# Patient Record
Sex: FEMALE
[De-identification: names, ages, dates, MRNs, and addresses within clinical notes are randomized per-mention and may not be internally consistent; named-entity substitution may affect disease eponyms.]

---

## 2020-03-06 ENCOUNTER — NURSE TRIAGE (OUTPATIENT)
Dept: OTHER | Facility: CLINIC | Age: 40
End: 2020-03-06

## 2020-03-06 NOTE — TELEPHONE ENCOUNTER
Reason for Disposition   [1] Constant abdominal pain AND [2] present > 2 hours    Protocols used: VOMITING-ADULT-AH    Patient called in via the [address] to report symptoms of generalized aches, diarrhea and vomiting beginning this morning. Reports having three episodes of vomiting today. States abdominal pain is present. Patient informed of disposition. Care advice as documented. Instructed patient to call back with worsening symptoms. Patient verbalized understanding and denies any further questions/concerns. Please do not respond to the triage nurse through this encounter. Any subsequent communication should be directly with the patient.

## 2023-07-19 ENCOUNTER — APPOINTMENT (OUTPATIENT)
Dept: PRIMARY CARE | Facility: CLINIC | Age: 43
End: 2023-07-19
Payer: COMMERCIAL

## 2023-07-25 PROBLEM — G47.33 OSA (OBSTRUCTIVE SLEEP APNEA): Status: ACTIVE | Noted: 2023-07-25

## 2023-07-25 PROBLEM — R10.2 PELVIC CRAMPING: Status: ACTIVE | Noted: 2023-07-25

## 2023-07-25 PROBLEM — R73.03 PREDIABETES: Status: ACTIVE | Noted: 2023-07-25

## 2023-07-25 PROBLEM — J30.9 ALLERGIC RHINITIS: Status: ACTIVE | Noted: 2023-07-25

## 2023-07-25 PROBLEM — M79.644 PAIN OF FINGER OF RIGHT HAND: Status: ACTIVE | Noted: 2023-07-25

## 2023-07-25 PROBLEM — R63.5 ABNORMAL WEIGHT GAIN: Status: ACTIVE | Noted: 2023-07-25

## 2023-07-25 PROBLEM — E78.3 HYPERTRIGLYCERIDEMIA, SPORADIC: Status: ACTIVE | Noted: 2023-07-25

## 2023-07-25 PROBLEM — M54.9 BACKACHE: Status: ACTIVE | Noted: 2023-07-25

## 2023-07-25 PROBLEM — N83.209 OVARIAN CYST: Status: ACTIVE | Noted: 2023-07-25

## 2023-07-25 PROBLEM — R20.8 DYSESTHESIA: Status: ACTIVE | Noted: 2023-07-25

## 2023-07-25 PROBLEM — E78.00 HYPERCHOLESTEREMIA: Status: ACTIVE | Noted: 2023-07-25

## 2023-07-25 PROBLEM — E28.2 PCOS (POLYCYSTIC OVARIAN SYNDROME): Status: ACTIVE | Noted: 2023-07-25

## 2023-07-25 PROBLEM — E55.9 VITAMIN D DEFICIENCY: Status: ACTIVE | Noted: 2023-07-25

## 2023-07-25 PROBLEM — E66.01 MORBID OBESITY (MULTI): Status: ACTIVE | Noted: 2023-07-25

## 2023-07-25 PROBLEM — R06.83 SNORING: Status: ACTIVE | Noted: 2023-07-25

## 2023-07-25 PROBLEM — N92.6 IRREGULAR MENSES: Status: ACTIVE | Noted: 2023-07-25

## 2023-07-25 PROBLEM — G47.00 INSOMNIA: Status: ACTIVE | Noted: 2023-07-25

## 2023-07-25 PROBLEM — N91.1 AMENORRHEA, SECONDARY: Status: ACTIVE | Noted: 2023-07-25

## 2023-07-25 PROBLEM — I10 BENIGN ESSENTIAL HTN: Status: ACTIVE | Noted: 2023-07-25

## 2023-07-25 PROBLEM — N93.9 ABNORMAL UTERINE BLEEDING: Status: ACTIVE | Noted: 2023-07-25

## 2023-07-25 PROBLEM — M54.2 NECK ACHE: Status: ACTIVE | Noted: 2023-07-25

## 2023-07-25 PROBLEM — M65.351 TRIGGER LITTLE FINGER OF RIGHT HAND: Status: ACTIVE | Noted: 2023-07-25

## 2023-07-25 PROBLEM — N97.9 FEMALE INFERTILITY: Status: ACTIVE | Noted: 2023-07-25

## 2023-07-25 PROBLEM — R40.0 DAYTIME SOMNOLENCE: Status: ACTIVE | Noted: 2023-07-25

## 2023-07-25 PROBLEM — R06.81 WITNESSED EPISODE OF APNEA: Status: ACTIVE | Noted: 2023-07-25

## 2023-07-25 RX ORDER — FLUTICASONE PROPIONATE 50 MCG
SPRAY, SUSPENSION (ML) NASAL
COMMUNITY
Start: 2020-10-05

## 2023-07-25 RX ORDER — CHOLECALCIFEROL (VITAMIN D3) 50 MCG
5000 TABLET ORAL
COMMUNITY
Start: 2021-03-10

## 2023-07-25 RX ORDER — METFORMIN HYDROCHLORIDE 500 MG/1
1 TABLET, EXTENDED RELEASE ORAL DAILY
COMMUNITY
Start: 2021-10-11 | End: 2024-02-07 | Stop reason: SINTOL

## 2023-07-25 RX ORDER — ROSUVASTATIN CALCIUM 20 MG/1
1 TABLET, COATED ORAL DAILY
COMMUNITY
Start: 2021-03-10 | End: 2023-07-26 | Stop reason: SDUPTHER

## 2023-07-25 RX ORDER — BLOOD-GLUCOSE METER
KIT MISCELLANEOUS
COMMUNITY
Start: 2018-10-30

## 2023-07-26 ENCOUNTER — OFFICE VISIT (OUTPATIENT)
Dept: PRIMARY CARE | Facility: CLINIC | Age: 43
End: 2023-07-26
Payer: COMMERCIAL

## 2023-07-26 ENCOUNTER — LAB (OUTPATIENT)
Dept: LAB | Facility: LAB | Age: 43
End: 2023-07-26
Payer: COMMERCIAL

## 2023-07-26 VITALS
HEIGHT: 65 IN | OXYGEN SATURATION: 99 % | SYSTOLIC BLOOD PRESSURE: 132 MMHG | WEIGHT: 290 LBS | DIASTOLIC BLOOD PRESSURE: 78 MMHG | HEART RATE: 91 BPM | BODY MASS INDEX: 48.32 KG/M2 | RESPIRATION RATE: 20 BRPM

## 2023-07-26 DIAGNOSIS — I10 BENIGN ESSENTIAL HTN: ICD-10-CM

## 2023-07-26 DIAGNOSIS — E78.2 MIXED HYPERLIPIDEMIA: ICD-10-CM

## 2023-07-26 DIAGNOSIS — M25.561 CHRONIC PAIN OF RIGHT KNEE: ICD-10-CM

## 2023-07-26 DIAGNOSIS — Z12.11 ENCOUNTER FOR SCREENING FOR MALIGNANT NEOPLASM OF COLON: Primary | ICD-10-CM

## 2023-07-26 DIAGNOSIS — Z00.00 HEALTHCARE MAINTENANCE: ICD-10-CM

## 2023-07-26 DIAGNOSIS — E78.00 HYPERCHOLESTEREMIA: ICD-10-CM

## 2023-07-26 DIAGNOSIS — M67.40 GANGLION: ICD-10-CM

## 2023-07-26 DIAGNOSIS — G89.29 CHRONIC PAIN OF RIGHT KNEE: ICD-10-CM

## 2023-07-26 DIAGNOSIS — Z12.31 ENCOUNTER FOR SCREENING MAMMOGRAM FOR MALIGNANT NEOPLASM OF BREAST: ICD-10-CM

## 2023-07-26 LAB
ALANINE AMINOTRANSFERASE (SGPT) (U/L) IN SER/PLAS: 17 U/L (ref 7–45)
ALBUMIN (G/DL) IN SER/PLAS: 4.4 G/DL (ref 3.4–5)
ALKALINE PHOSPHATASE (U/L) IN SER/PLAS: 47 U/L (ref 33–110)
ANION GAP IN SER/PLAS: 15 MMOL/L (ref 10–20)
ASPARTATE AMINOTRANSFERASE (SGOT) (U/L) IN SER/PLAS: 22 U/L (ref 9–39)
BASOPHILS (10*3/UL) IN BLOOD BY AUTOMATED COUNT: 0.03 X10E9/L (ref 0–0.1)
BASOPHILS/100 LEUKOCYTES IN BLOOD BY AUTOMATED COUNT: 0.7 % (ref 0–2)
BILIRUBIN TOTAL (MG/DL) IN SER/PLAS: 0.4 MG/DL (ref 0–1.2)
CALCIUM (MG/DL) IN SER/PLAS: 9.7 MG/DL (ref 8.6–10.6)
CARBON DIOXIDE, TOTAL (MMOL/L) IN SER/PLAS: 25 MMOL/L (ref 21–32)
CHLORIDE (MMOL/L) IN SER/PLAS: 103 MMOL/L (ref 98–107)
CHOLESTEROL (MG/DL) IN SER/PLAS: 173 MG/DL (ref 0–199)
CHOLESTEROL IN HDL (MG/DL) IN SER/PLAS: 38.6 MG/DL
CHOLESTEROL/HDL RATIO: 4.5
CREATININE (MG/DL) IN SER/PLAS: 0.67 MG/DL (ref 0.5–1.05)
EOSINOPHILS (10*3/UL) IN BLOOD BY AUTOMATED COUNT: 0.03 X10E9/L (ref 0–0.7)
EOSINOPHILS/100 LEUKOCYTES IN BLOOD BY AUTOMATED COUNT: 0.7 % (ref 0–6)
ERYTHROCYTE DISTRIBUTION WIDTH (RATIO) BY AUTOMATED COUNT: 15.7 % (ref 11.5–14.5)
ERYTHROCYTE MEAN CORPUSCULAR HEMOGLOBIN CONCENTRATION (G/DL) BY AUTOMATED: 30.6 G/DL (ref 32–36)
ERYTHROCYTE MEAN CORPUSCULAR VOLUME (FL) BY AUTOMATED COUNT: 87 FL (ref 80–100)
ERYTHROCYTES (10*6/UL) IN BLOOD BY AUTOMATED COUNT: 5.13 X10E12/L (ref 4–5.2)
ESTIMATED AVERAGE GLUCOSE FOR HBA1C: 137 MG/DL
GFR FEMALE: >90 ML/MIN/1.73M2
GLUCOSE (MG/DL) IN SER/PLAS: 86 MG/DL (ref 74–99)
HEMATOCRIT (%) IN BLOOD BY AUTOMATED COUNT: 44.8 % (ref 36–46)
HEMOGLOBIN (G/DL) IN BLOOD: 13.7 G/DL (ref 12–16)
HEMOGLOBIN A1C/HEMOGLOBIN TOTAL IN BLOOD: 6.4 %
IMMATURE GRANULOCYTES/100 LEUKOCYTES IN BLOOD BY AUTOMATED COUNT: 1.1 % (ref 0–0.9)
LDL: 105 MG/DL (ref 0–99)
LEUKOCYTES (10*3/UL) IN BLOOD BY AUTOMATED COUNT: 4.6 X10E9/L (ref 4.4–11.3)
LYMPHOCYTES (10*3/UL) IN BLOOD BY AUTOMATED COUNT: 3.24 X10E9/L (ref 1.2–4.8)
LYMPHOCYTES/100 LEUKOCYTES IN BLOOD BY AUTOMATED COUNT: 70.4 % (ref 13–44)
MONOCYTES (10*3/UL) IN BLOOD BY AUTOMATED COUNT: 0.28 X10E9/L (ref 0.1–1)
MONOCYTES/100 LEUKOCYTES IN BLOOD BY AUTOMATED COUNT: 6.1 % (ref 2–10)
NEUTROPHILS (10*3/UL) IN BLOOD BY AUTOMATED COUNT: 0.97 X10E9/L (ref 1.2–7.7)
NEUTROPHILS/100 LEUKOCYTES IN BLOOD BY AUTOMATED COUNT: 21 % (ref 40–80)
NRBC (PER 100 WBCS) BY AUTOMATED COUNT: 0 /100 WBC (ref 0–0)
OVALOCYTES PRESENCE IN BLOOD BY LIGHT MICROSCOPY: NORMAL
PLATELETS (10*3/UL) IN BLOOD AUTOMATED COUNT: 40 X10E9/L (ref 150–450)
POTASSIUM (MMOL/L) IN SER/PLAS: 5.2 MMOL/L (ref 3.5–5.3)
PROTEIN TOTAL: 7.9 G/DL (ref 6.4–8.2)
RBC MORPHOLOGY IN BLOOD: NORMAL
SODIUM (MMOL/L) IN SER/PLAS: 138 MMOL/L (ref 136–145)
THYROTROPIN (MIU/L) IN SER/PLAS BY DETECTION LIMIT <= 0.05 MIU/L: 2.26 MIU/L (ref 0.44–3.98)
TRIGLYCERIDE (MG/DL) IN SER/PLAS: 147 MG/DL (ref 0–149)
UREA NITROGEN (MG/DL) IN SER/PLAS: 15 MG/DL (ref 6–23)
VLDL: 29 MG/DL (ref 0–40)

## 2023-07-26 PROCEDURE — 99213 OFFICE O/P EST LOW 20 MIN: CPT | Performed by: INTERNAL MEDICINE

## 2023-07-26 PROCEDURE — 85060 BLOOD SMEAR INTERPRETATION: CPT | Performed by: PATHOLOGY

## 2023-07-26 PROCEDURE — 85025 COMPLETE CBC W/AUTO DIFF WBC: CPT

## 2023-07-26 PROCEDURE — 3008F BODY MASS INDEX DOCD: CPT | Performed by: INTERNAL MEDICINE

## 2023-07-26 PROCEDURE — 1036F TOBACCO NON-USER: CPT | Performed by: INTERNAL MEDICINE

## 2023-07-26 PROCEDURE — 80053 COMPREHEN METABOLIC PANEL: CPT

## 2023-07-26 PROCEDURE — 3078F DIAST BP <80 MM HG: CPT | Performed by: INTERNAL MEDICINE

## 2023-07-26 PROCEDURE — 84443 ASSAY THYROID STIM HORMONE: CPT

## 2023-07-26 PROCEDURE — 3075F SYST BP GE 130 - 139MM HG: CPT | Performed by: INTERNAL MEDICINE

## 2023-07-26 PROCEDURE — 36415 COLL VENOUS BLD VENIPUNCTURE: CPT

## 2023-07-26 PROCEDURE — 83036 HEMOGLOBIN GLYCOSYLATED A1C: CPT

## 2023-07-26 PROCEDURE — 80061 LIPID PANEL: CPT

## 2023-07-26 PROCEDURE — 99396 PREV VISIT EST AGE 40-64: CPT | Performed by: INTERNAL MEDICINE

## 2023-07-26 RX ORDER — ROSUVASTATIN CALCIUM 20 MG/1
20 TABLET, COATED ORAL DAILY
Qty: 90 TABLET | Refills: 1 | Status: SHIPPED | OUTPATIENT
Start: 2023-07-26

## 2023-07-26 ASSESSMENT — ENCOUNTER SYMPTOMS
SINUS PRESSURE: 0
CHILLS: 0
SORE THROAT: 0
NECK STIFFNESS: 0
RHINORRHEA: 0
FATIGUE: 0
BLOOD IN STOOL: 0
SHORTNESS OF BREATH: 0
DIAPHORESIS: 0
NAUSEA: 0
HEADACHES: 0
DIZZINESS: 0
VOMITING: 0
CONSTIPATION: 0
JOINT SWELLING: 0
ABDOMINAL DISTENTION: 0
OCCASIONAL FEELINGS OF UNSTEADINESS: 0
NECK PAIN: 0
BACK PAIN: 0
ABDOMINAL PAIN: 0
LOSS OF SENSATION IN FEET: 0
DYSURIA: 0
ARTHRALGIAS: 1
COUGH: 0
PALPITATIONS: 0
NUMBNESS: 0
DIARRHEA: 0
WEAKNESS: 0
LIGHT-HEADEDNESS: 0
FEVER: 0
MYALGIAS: 0
FREQUENCY: 0
APPETITE CHANGE: 0
DIFFICULTY URINATING: 0
WHEEZING: 0
DEPRESSION: 0
HEMATURIA: 0

## 2023-07-26 ASSESSMENT — PATIENT HEALTH QUESTIONNAIRE - PHQ9
1. LITTLE INTEREST OR PLEASURE IN DOING THINGS: NOT AT ALL
SUM OF ALL RESPONSES TO PHQ9 QUESTIONS 1 AND 2: 0
2. FEELING DOWN, DEPRESSED OR HOPELESS: NOT AT ALL

## 2023-07-26 NOTE — PROGRESS NOTES
"Subjective   Patient ID: Paul Hawkins is a 43 y.o. female who presents for physical and knee pain.    HPI   She complains of right knee locking, buckling, stiffness and intermittent pain. This has been occurring for months. She feels it started after gaining a lot of weight. She is interested in weight loss. She complains of right sided forehead nodule, which has tingling, shooting sensation when rubbed or touched.     Review of Systems   Constitutional:  Negative for appetite change, chills, diaphoresis, fatigue and fever.   HENT:  Negative for congestion, ear discharge, ear pain, hearing loss, postnasal drip, rhinorrhea, sinus pressure, sore throat and tinnitus.    Eyes:  Negative for visual disturbance.   Respiratory:  Negative for cough, shortness of breath and wheezing.    Cardiovascular:  Negative for chest pain, palpitations and leg swelling.   Gastrointestinal:  Negative for abdominal distention, abdominal pain, blood in stool, constipation, diarrhea, nausea and vomiting.   Genitourinary:  Negative for decreased urine volume, difficulty urinating, dysuria, frequency, hematuria and urgency.   Musculoskeletal:  Positive for arthralgias. Negative for back pain, gait problem, joint swelling, myalgias, neck pain and neck stiffness.   Skin:  Negative for rash.   Neurological:  Negative for dizziness, weakness, light-headedness, numbness and headaches.       Objective   /78 (BP Location: Right arm, Patient Position: Sitting, BP Cuff Size: Large adult) Comment: beatriz bp  Pulse 91   Resp 20   Ht 1.638 m (5' 4.5\")   Wt 132 kg (290 lb)   SpO2 99%   BMI 49.01 kg/m²     Physical Exam  Vitals reviewed.   Constitutional:       Appearance: Normal appearance. She is normal weight.   HENT:      Right Ear: Tympanic membrane and external ear normal.      Left Ear: Tympanic membrane and external ear normal.   Eyes:      Extraocular Movements: Extraocular movements intact.      Conjunctiva/sclera: Conjunctivae " normal.      Pupils: Pupils are equal, round, and reactive to light.   Cardiovascular:      Rate and Rhythm: Normal rate and regular rhythm.      Pulses: Normal pulses.   Pulmonary:      Effort: Pulmonary effort is normal.      Breath sounds: Normal breath sounds.   Abdominal:      General: Bowel sounds are normal.      Palpations: Abdomen is soft.   Musculoskeletal:         General: Normal range of motion.      Cervical back: Normal range of motion.      Comments: Right knee exam normal.    Skin:     General: Skin is warm and dry.      Comments: Right side forehead small 0.5 cm nodule, immobile, firm, mildly tender.    Neurological:      General: No focal deficit present.      Mental Status: She is oriented to person, place, and time.   Psychiatric:         Mood and Affect: Mood normal.         Behavior: Behavior normal.         Assessment/Plan   Problem List Items Addressed This Visit       Benign essential HTN    Relevant Orders    Comprehensive Metabolic Panel    Hemoglobin A1C    Lipid Panel    Hypercholesteremia    Chronic pain of right knee     Warm or cold compress PRN  Ibuprofen OTC PRN  PT ordered          Relevant Orders    Referral to Physical Therapy    Healthcare maintenance    Ganglion     O/E: Right side forehead small 0.5 cm nodule, immobile, firm, mildly tender.   Monitor          BMI 45.0-49.9, adult (CMS/HCC)    Relevant Orders    CBC and Auto Differential    Hemoglobin A1C    Lipid Panel    TSH with reflex to Free T4 if abnormal    Referral to Comprehensive Weight Loss    Referral to Nutrition Services    Mixed hyperlipidemia    Relevant Medications    rosuvastatin (Crestor) 20 mg tablet    Other Relevant Orders    Lipid Panel    Encounter for screening for malignant neoplasm of colon - Primary    Relevant Orders    Cologuard® colon cancer screening    Encounter for screening mammogram for malignant neoplasm of breast    Relevant Orders    BI mammo bilateral screening tomosynthesis   RTC in 3-4  mo

## 2023-07-27 ENCOUNTER — TELEPHONE (OUTPATIENT)
Dept: PRIMARY CARE | Facility: CLINIC | Age: 43
End: 2023-07-27
Payer: COMMERCIAL

## 2023-07-27 ENCOUNTER — DOCUMENTATION (OUTPATIENT)
Dept: PRIMARY CARE | Facility: CLINIC | Age: 43
End: 2023-07-27
Payer: COMMERCIAL

## 2023-07-27 NOTE — RESULT ENCOUNTER NOTE
I have tried calling this patient numerous times concerning the reported thrombocytopenia. I have gotten no response. I did leave a VM and a call back number. Ancillary staff has also tried repeatedly without success. A letter will be sent if she can not be reached via phone    First step in evaluation of this thrombocytopenia would be to repeat the CBC (may be due to EDTA plt clumping). If persistent plt elevation then will send to hematology for further investigation.

## 2023-07-27 NOTE — TELEPHONE ENCOUNTER
Patient returned call to office after we had been calling her numerous times today to discuss results. I was in with a patient when she returned the call. She was told I would call her back shortly. I call the patient, she did not answer.

## 2023-07-29 LAB — CBC DIFFERENTIAL PATH REVIEW: NORMAL

## 2023-07-31 NOTE — RESULT ENCOUNTER NOTE
I have tried calling the patient numerous times, unsuccessfully. If you are able to reach the patient please inform her platelet levels are low. Platelets are blood cell responsible for clotting. This reading may be due to EDTA clumping in tube during collection or can be a true low platelet count. I would like to repeat the CBC to assess this. The order has been placed. If it is a true decrease in platelet count the next step would be to have the cause further evaluated by a hematologist.     If patient can not be reached, a letter will be mailed to her with the above details.

## 2023-08-01 ENCOUNTER — TELEPHONE (OUTPATIENT)
Dept: PRIMARY CARE | Facility: CLINIC | Age: 43
End: 2023-08-01
Payer: COMMERCIAL

## 2023-08-16 ENCOUNTER — TELEPHONE (OUTPATIENT)
Dept: PRIMARY CARE | Facility: CLINIC | Age: 43
End: 2023-08-16
Payer: COMMERCIAL

## 2023-08-16 LAB — NONINV COLON CA DNA+OCC BLD SCRN STL QL: NEGATIVE

## 2023-10-06 PROBLEM — H92.09 OTALGIA: Status: ACTIVE | Noted: 2023-10-06

## 2023-10-06 PROBLEM — T24.211A SECOND DEGREE BURN OF RIGHT THIGH: Status: ACTIVE | Noted: 2019-07-17

## 2023-10-06 PROBLEM — R30.0 DYSURIA: Status: ACTIVE | Noted: 2023-10-06

## 2023-10-06 PROBLEM — N93.9 ABNORMAL VAGINAL BLEEDING: Status: ACTIVE | Noted: 2023-10-06

## 2023-10-06 PROBLEM — J02.9 PHARYNGITIS: Status: ACTIVE | Noted: 2023-10-06

## 2023-10-06 RX ORDER — IBUPROFEN 600 MG/1
600 TABLET ORAL EVERY 6 HOURS PRN
COMMUNITY
Start: 2019-07-09 | End: 2023-12-11 | Stop reason: WASHOUT

## 2023-10-06 RX ORDER — IBUPROFEN 800 MG/1
800 TABLET ORAL EVERY 6 HOURS PRN
COMMUNITY
Start: 2019-06-24 | End: 2024-02-07 | Stop reason: WASHOUT

## 2023-10-06 RX ORDER — HYDROCHLOROTHIAZIDE 12.5 MG/1
12.5 TABLET ORAL EVERY MORNING
COMMUNITY
Start: 2018-10-29 | End: 2024-02-06 | Stop reason: ALTCHOICE

## 2023-10-06 RX ORDER — DEXTROSE 4 G
TABLET,CHEWABLE ORAL
COMMUNITY
Start: 2018-10-30

## 2023-10-06 RX ORDER — CETIRIZINE HYDROCHLORIDE 10 MG/1
10 TABLET ORAL
COMMUNITY

## 2023-10-06 RX ORDER — LIDOCAINE 50 MG/G
1 OINTMENT TOPICAL 3 TIMES DAILY
COMMUNITY
Start: 2019-07-09 | End: 2024-02-07 | Stop reason: WASHOUT

## 2023-10-11 ENCOUNTER — EVALUATION (OUTPATIENT)
Dept: PHYSICAL THERAPY | Facility: CLINIC | Age: 43
End: 2023-10-11
Payer: COMMERCIAL

## 2023-10-11 DIAGNOSIS — G89.29 CHRONIC PAIN OF RIGHT KNEE: Primary | ICD-10-CM

## 2023-10-11 DIAGNOSIS — M25.561 CHRONIC PAIN OF RIGHT KNEE: Primary | ICD-10-CM

## 2023-10-11 PROCEDURE — 97110 THERAPEUTIC EXERCISES: CPT | Mod: GP

## 2023-10-11 PROCEDURE — 97161 PT EVAL LOW COMPLEX 20 MIN: CPT | Mod: GP

## 2023-10-11 PROCEDURE — 97535 SELF CARE MNGMENT TRAINING: CPT | Mod: GP

## 2023-10-11 ASSESSMENT — PAIN SCALES - GENERAL: PAINLEVEL_OUTOF10: 4

## 2023-10-11 ASSESSMENT — ENCOUNTER SYMPTOMS
DEPRESSION: 0
OCCASIONAL FEELINGS OF UNSTEADINESS: 0
LOSS OF SENSATION IN FEET: 0

## 2023-10-11 ASSESSMENT — PAIN - FUNCTIONAL ASSESSMENT: PAIN_FUNCTIONAL_ASSESSMENT: 0-10

## 2023-10-11 NOTE — LETTER
October 11, 2023     Patient: Paul Hawkins   YOB: 1980   Date of Visit: 10/11/2023       To Whom it May Concern:    Paul Hawkins was seen in my clinic on 10/11/2023. She {Return to school/sport:65027}.    If you have any questions or concerns, please don't hesitate to call.         Sincerely,          Maricarmen Barboza, PT        CC: No Recipients

## 2023-10-11 NOTE — PROGRESS NOTES
Physical Therapy  Physical Therapy Orthopedic Evaluation    Patient Name: Paul Hawkins  MRN: [id number]  Today's Date: 10/11/2023  Time Calculation  Start Time: 0830  Stop Time: 0915  Time Calculation (min): 45 min    Insurance:  Visit number: 1 of 60  Authorization info: no auth needed  Insurance Type: [place]         General:  Reason for visit: Right knee pain  Referred by: Dr. 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 Pt would benefit from physical therapy to address the impairments found & listed previously in the objective section in order to return to safe and pain-free ADLs and prior level of function.       Plan:     Planned Interventions include: therapeutic exercise, self-care home management, manual therapy, therapeutic activities, gait training, neuromuscular coordination, vasopneumatic, dry needling, aquatic therapy  Frequency: 1 x Week  Duration: 8 weeks    Current Problem  1. Chronic pain of right knee  Follow Up In Physical Therapy          Precautions  STEADI Fall Risk Score (The score of 4 or more indicates an increased risk of falling): 2  Precautions Comment: none    Medical History Form: Reviewed (scanned into chart)    Subjective:   Subjective   Chief Complaint: Patient presents to clinic with chronic right knee pain since a fall last year. Gets popping and pain.  Onset Date: 6/1/22  ANIYAH: Fall    Current Condition:   Same    Pain:  Pain Assessment: 0-10  Pain Score: 4  Location: popping, throbbing, sharp  Description: right anterior knee  Aggravating Factors: Standing, Walking, and Stair negotiation, laying to standing   Relieving Factors:  Rest and Heat    Relevant Information (PMH & Previous Tests/Imaging): no imaging completed   Previous Interventions/Treatments: None    Prior  Level of Function (PLOF)  Patient previously independent with all ADLs  Exercise/Physical Activity: walking  Work/School: on feet for 2 hours Tues and Thurs    Patients Living Environment: Reviewed and no concern  Stairs:    Primary Language: English    Patient's Goal(s) for Therapy: Want to be able to walk and get weight off.    Red Flags: Do you have any of the following? No  Fever/chills, unexplained weight changes, dizziness/fainting, unexplained change in bowel or bladder functions, unexplained malaise or muscle weakness, night pain/sweats, numbness or tingling    Objective:    Gait: waddling gait pattern, decreased stance time RLE       Balance: SLS-     R: 8 sec.           L: 30 sec.    Palpation: TTP right med/lat joint lines and patellar tendon    Flexibility: min tight B HS,quad, gastroc    Orthotics: no    Mid-patella Edema:   R:  43 cm             L: 43 cm      ROM      Knee AROM (Degrees)      (R)  (L)  Flexion: 115  122      Extension: 0  0           Strength Testing    LE strength MMT  R L  Hip flexion  5/5 5/5  Hip extension  3+/5 4-/5  Hip abduction  4-/5 4-/5  Hip adduction  3+/5 4-/5  Hip IR   4/5 5/5  Hip ER   4/5 5/5  Knee extension 4/5 4+/5  Knee flexion  5/5 5/5          Functional Screening  Squat: pain  Lunge: pain      Special Tests    Meniscus   Yanick Test: negative  Patella   Apprehension Test: negative   Grind Test: positive      Outcome Measures:  Other Measures  Lower Extremity Funtional Score (LEFS): 45/80     EDUCATION: home exercise program, plan of care, activity modifications, pain management, and injury pathology       Goals: Set and discussed today  Encounter Problems       Encounter Problems (Active)       PT Problem       STG       Start:  10/11/23    Expected End:  01/09/24       Patient will decrease pain to 0-2/10 in 4 weeks.   Patient will increase strength by >/= 1/2 mm grade in 4 weeks.  Oswestry: -6 in 4 weeks.  LEFS: +9 in 4 weeks.           LTG       Start:   10/11/23    Expected End:  01/09/24       Patient will be able to walk >10 mins without increased pain in 8 weeks.  Patient will be able to ascend/descend stairs with step through pattern in 8 weeks.    Patient will increase LE flexibility to min tight in 8 weeks.   Patient will be able to maintain SLS >/= 10 seconds in 8 weeks.                   Plan of care was developed with input and agreement by the patient      Treatment Performed:  Access Code: [email or web address]      Exercises  - Supine Quadricep Sets  - 2 x daily - 7 x weekly - 2 sets - 10 reps - 5 sec hold  - Supine Heel Slide  - 2 x daily - 7 x weekly - 2 sets - 10 reps - 5 sec hold  - Supine Active Straight Leg Raise  - 2 x daily - 7 x weekly - 2 sets - 10 reps  - Sidelying Hip Abduction  - 2 x daily - 7 x weekly - 2 sets - 10 reps  - Supine Bridge  - 2 x daily - 7 x weekly - 2 sets - 10 reps      Charges: Low complexity ROSALBA jaramillo, Self care    Maricarmen Barboza, PT

## 2023-10-19 NOTE — PROGRESS NOTES
"Physical Therapy  Physical Therapy Treatment    Patient Name: Paul Hawkins  MRN: [id number]  Today's Date: 10/20/2023  Time Calculation  Start Time: 1245  Stop Time: 1330  Time Calculation (min): 45 min    Insurance:  Visit number: 2 of 60  Authorization info: no auth needed  Insurance Type: [place]- no modalities           General:  Reason for visit: Right knee pain  Referred by: Dr. Mandujano      Current Problem  1. Chronic pain of right knee              Precautions  Precautions Comment: none      Pain Assessment: 0-10  Pain Score: 2    Subjective:   Patient reports that her knee has been feeling better and exercises are getting a little easier.   HEP Performed:  Yes    Objective:   Right knee ROM: 0-115 deg    Treatments:   R. Stepper, seat 9. L3.5, 5'   DBE 2'x2 directions   Slow airex march 2'  Fwd bosu lunges x 10 B  6\" step ups x 10 B  Tandem airex 4 Kg KB swing cw/ccw x 10 B  Biodex LOS L12: 5%, 6%  Standing HS stretch 1' B    Access Code: [email or web address]    Charges: TEx2, NMx1    Assessment:   Patient with muscle fatigue during progression of standing strengthening and balance exercises.      Plan:   Continue to increase LE strength, flexibility, balance and WB endurance.    Maricarmen Barboza, PT  "

## 2023-10-20 ENCOUNTER — TREATMENT (OUTPATIENT)
Dept: PHYSICAL THERAPY | Facility: CLINIC | Age: 43
End: 2023-10-20
Payer: COMMERCIAL

## 2023-10-20 DIAGNOSIS — G89.29 CHRONIC PAIN OF RIGHT KNEE: Primary | ICD-10-CM

## 2023-10-20 DIAGNOSIS — M25.561 CHRONIC PAIN OF RIGHT KNEE: Primary | ICD-10-CM

## 2023-10-20 PROCEDURE — 97110 THERAPEUTIC EXERCISES: CPT | Mod: GP

## 2023-10-20 PROCEDURE — 97112 NEUROMUSCULAR REEDUCATION: CPT | Mod: GP

## 2023-10-20 ASSESSMENT — PAIN - FUNCTIONAL ASSESSMENT: PAIN_FUNCTIONAL_ASSESSMENT: 0-10

## 2023-10-20 ASSESSMENT — PAIN SCALES - GENERAL: PAINLEVEL_OUTOF10: 2

## 2023-10-26 NOTE — PROGRESS NOTES
"Physical Therapy  Physical Therapy Treatment    Patient Name: Paul Hawkins  MRN: [id number]  Today's Date: 10/27/2023  Time Calculation  Start Time: 1330  Stop Time: 1410  Time Calculation (min): 40 min    Insurance:  Visit number: 3 of 60  Authorization info: no auth needed  Insurance Type: [place]- no modalities           General:  Reason for visit: Right knee pain  Referred by: Dr. Mandujano      Current Problem  1. Chronic pain of right knee            Precautions  Precautions Comment: none      Pain Assessment: 0-10    Subjective:   Patient reports mild back muscle soreness after last session.     HEP Performed:  Yes    Objective:   Right knee ROM: 0-115 deg    Treatments:   R. Stepper, seat 9. L3.5, 5'   DBE 2'x2 directions   6\" step up and hold x 10 B  Tandem 4 Kg KB swing cw/ccw x 10 B  Slow airex march 2'  Fwd bosu lunges x 15 B  Lat bosu lunges x 15 B  Tandem airex 4 Kg KB swing cw/ccw x 10 B  Biodex LOS L12: 4%, 9%  Standing HS stretch 1' B    Access Code: [email or web address]    Charges: TEx1, NMx2    Assessment:   Patient challenged by dynamic balance progression today.      Plan:   Continue to increase LE strength, flexibility, balance and WB endurance.    Maricarmen Barboza, PT  "

## 2023-10-27 ENCOUNTER — TREATMENT (OUTPATIENT)
Dept: PHYSICAL THERAPY | Facility: CLINIC | Age: 43
End: 2023-10-27
Payer: COMMERCIAL

## 2023-10-27 DIAGNOSIS — M25.561 CHRONIC PAIN OF RIGHT KNEE: Primary | ICD-10-CM

## 2023-10-27 DIAGNOSIS — G89.29 CHRONIC PAIN OF RIGHT KNEE: Primary | ICD-10-CM

## 2023-10-27 PROCEDURE — 97112 NEUROMUSCULAR REEDUCATION: CPT | Mod: GP

## 2023-10-27 PROCEDURE — 97110 THERAPEUTIC EXERCISES: CPT | Mod: GP

## 2023-10-27 ASSESSMENT — PAIN - FUNCTIONAL ASSESSMENT: PAIN_FUNCTIONAL_ASSESSMENT: 0-10

## 2023-11-01 ENCOUNTER — DOCUMENTATION (OUTPATIENT)
Dept: PHYSICAL THERAPY | Facility: CLINIC | Age: 43
End: 2023-11-01
Payer: COMMERCIAL

## 2023-11-01 NOTE — PROGRESS NOTES
Physical Therapy                 Therapy Communication Note    Patient Name: Paul Hawkins  MRN: [id number]  Today's Date: 11/1/2023     Discipline: Physical Therapy    Missed Visit Reason:      Missed Time: No Show    Comment:

## 2023-11-09 NOTE — PROGRESS NOTES
Physical Therapy  Physical Therapy Treatment    Patient Name: Paul Hawkins  MRN: [id number]  Today's Date: 11/10/2023  Time Calculation  Start Time: 1200  Stop Time: 1245  Time Calculation (min): 45 min    Insurance:  Visit number: 4 of 60  Authorization info: no auth needed  Insurance Type: [place]- no modalities           General:  Reason for visit: Right knee pain  Referred by: Dr. Mandujano      Current Problem  1. Chronic pain of right knee              Precautions  Precautions Comment: none      Pain Assessment: 0-10  Pain Score: 2    Subjective:   Patient reports knee has been feeling better. Occasionally gets some popping.    HEP Performed:  Yes    Objective:   Right knee ROM: 0-115 deg    Treatments:   R. Stepper, seat 9. L4, 5'   DBE 2'x2 directions   Airex step up and hold x 10 B  Fwd bosu lunges x 15 B  Tandem airex 4 Kg KB swing cw/ccw x 15 B  Leg press 75#, 15x2- HEP  Biodex LOS L12: 14%, 17%  Bravo side steps 7.5#, 5x B- HEP  Standing HS stretch 1' B    Access Code: [email or web address]    Charges: TEx1, NMx2    Assessment:   Patient with improved endurance today. Will add leg press and resisted side steps to gym routine.      Plan:   Continue to increase LE strength, flexibility, balance and WB endurance.    Maricarmen Barboza, PT

## 2023-11-10 ENCOUNTER — TREATMENT (OUTPATIENT)
Dept: PHYSICAL THERAPY | Facility: CLINIC | Age: 43
End: 2023-11-10
Payer: COMMERCIAL

## 2023-11-10 DIAGNOSIS — M25.561 CHRONIC PAIN OF RIGHT KNEE: Primary | ICD-10-CM

## 2023-11-10 DIAGNOSIS — G89.29 CHRONIC PAIN OF RIGHT KNEE: Primary | ICD-10-CM

## 2023-11-10 PROCEDURE — 97112 NEUROMUSCULAR REEDUCATION: CPT | Mod: GP

## 2023-11-10 PROCEDURE — 97110 THERAPEUTIC EXERCISES: CPT | Mod: GP

## 2023-11-10 ASSESSMENT — PAIN SCALES - GENERAL: PAINLEVEL_OUTOF10: 2

## 2023-11-10 ASSESSMENT — PAIN - FUNCTIONAL ASSESSMENT: PAIN_FUNCTIONAL_ASSESSMENT: 0-10

## 2023-11-16 NOTE — PROGRESS NOTES
Physical Therapy  Physical Therapy Treatment    Patient Name: Paul Hawkins  MRN: [id number]  Today's Date: 11/16/2023       Insurance:  Visit number: 5 of 60  Authorization info: no auth needed  Insurance Type: [place]- no modalities           General:  Reason for visit: Right knee pain  Referred by: Dr. Mandujano      Current Problem  No diagnosis found.                    Subjective:   Patient reports knee has been feeling better. Occasionally gets some popping.    HEP Performed:  Yes    Objective:   Right knee ROM: 0-115 deg    Treatments:   R. Stepper, seat 9. L4, 5'   DBE 2'x2 directions   Airex step up and hold x 10 B  Fwd bosu lunges x 15 B  Tandem airex 4 Kg KB swing cw/ccw x 15 B  Leg press 75#, 15x2- HEP  Biodex LOS L12: 14%, 17%  Bravo side steps 7.5#, 5x B- HEP  Standing HS stretch 1' B    Access Code: [email or web address]    Charges: TEx1, NMx2    Assessment:   Patient with improved endurance today. Will add leg press and resisted side steps to gym routine.      Plan:   Continue to increase LE strength, flexibility, balance and WB endurance.    Maricarmen Barboza, PT

## 2023-11-17 ENCOUNTER — DOCUMENTATION (OUTPATIENT)
Dept: PHYSICAL THERAPY | Facility: CLINIC | Age: 43
End: 2023-11-17
Payer: COMMERCIAL

## 2023-11-17 ENCOUNTER — APPOINTMENT (OUTPATIENT)
Dept: PHYSICAL THERAPY | Facility: CLINIC | Age: 43
End: 2023-11-17
Payer: COMMERCIAL

## 2023-11-17 NOTE — PROGRESS NOTES
Physical Therapy                 Therapy Communication Note    Patient Name: Paul Hawkins  MRN: [id number]  Today's Date: 11/17/2023     Discipline: Physical Therapy    Missed Visit Reason:  sick    Missed Time: Cancel    Comment:

## 2023-11-27 ENCOUNTER — APPOINTMENT (OUTPATIENT)
Dept: PRIMARY CARE | Facility: CLINIC | Age: 43
End: 2023-11-27
Payer: COMMERCIAL

## 2023-11-27 ENCOUNTER — APPOINTMENT (OUTPATIENT)
Dept: PHYSICAL THERAPY | Facility: CLINIC | Age: 43
End: 2023-11-27
Payer: COMMERCIAL

## 2023-12-05 ENCOUNTER — DOCUMENTATION (OUTPATIENT)
Dept: PHYSICAL THERAPY | Facility: CLINIC | Age: 43
End: 2023-12-05
Payer: COMMERCIAL

## 2023-12-05 NOTE — PROGRESS NOTES
Physical Therapy                 Therapy Communication Note    Patient Name: Paul Hawkins  MRN: [id number]  Today's Date: 12/5/2023     Discipline: Physical Therapy    Missed Visit Reason:  unknown    Missed Time: No Show

## 2023-12-11 ENCOUNTER — OFFICE VISIT (OUTPATIENT)
Dept: PRIMARY CARE | Facility: CLINIC | Age: 43
End: 2023-12-11
Payer: COMMERCIAL

## 2023-12-11 ENCOUNTER — LAB (OUTPATIENT)
Dept: LAB | Facility: LAB | Age: 43
End: 2023-12-11
Payer: COMMERCIAL

## 2023-12-11 ENCOUNTER — APPOINTMENT (OUTPATIENT)
Dept: PHYSICAL THERAPY | Facility: CLINIC | Age: 43
End: 2023-12-11
Payer: COMMERCIAL

## 2023-12-11 VITALS
RESPIRATION RATE: 18 BRPM | HEART RATE: 82 BPM | HEIGHT: 65 IN | SYSTOLIC BLOOD PRESSURE: 130 MMHG | WEIGHT: 290 LBS | DIASTOLIC BLOOD PRESSURE: 80 MMHG | BODY MASS INDEX: 48.32 KG/M2 | OXYGEN SATURATION: 96 %

## 2023-12-11 DIAGNOSIS — D69.6 THROMBOCYTOPENIA (CMS-HCC): ICD-10-CM

## 2023-12-11 DIAGNOSIS — R73.03 PREDIABETES: ICD-10-CM

## 2023-12-11 DIAGNOSIS — I10 BENIGN ESSENTIAL HTN: Primary | ICD-10-CM

## 2023-12-11 DIAGNOSIS — I10 BENIGN ESSENTIAL HTN: ICD-10-CM

## 2023-12-11 DIAGNOSIS — L98.9 BENIGN SKIN LESION OF FOREHEAD: ICD-10-CM

## 2023-12-11 LAB
ALBUMIN SERPL BCP-MCNC: 4.3 G/DL (ref 3.4–5)
ALP SERPL-CCNC: 43 U/L (ref 33–110)
ALT SERPL W P-5'-P-CCNC: 16 U/L (ref 7–45)
ANION GAP SERPL CALC-SCNC: 11 MMOL/L (ref 10–20)
AST SERPL W P-5'-P-CCNC: 13 U/L (ref 9–39)
BASOPHILS # BLD AUTO: 0.04 X10*3/UL (ref 0–0.1)
BASOPHILS NFR BLD AUTO: 0.7 %
BILIRUB SERPL-MCNC: 0.3 MG/DL (ref 0–1.2)
BUN SERPL-MCNC: 16 MG/DL (ref 6–23)
CALCIUM SERPL-MCNC: 9.6 MG/DL (ref 8.6–10.6)
CHLORIDE SERPL-SCNC: 102 MMOL/L (ref 98–107)
CO2 SERPL-SCNC: 30 MMOL/L (ref 21–32)
CREAT SERPL-MCNC: 0.6 MG/DL (ref 0.5–1.05)
EOSINOPHIL # BLD AUTO: 0.1 X10*3/UL (ref 0–0.7)
EOSINOPHIL NFR BLD AUTO: 1.8 %
ERYTHROCYTE [DISTWIDTH] IN BLOOD BY AUTOMATED COUNT: 14.6 % (ref 11.5–14.5)
EST. AVERAGE GLUCOSE BLD GHB EST-MCNC: 128 MG/DL
GFR SERPL CREATININE-BSD FRML MDRD: >90 ML/MIN/1.73M*2
GLUCOSE SERPL-MCNC: 94 MG/DL (ref 74–99)
HBA1C MFR BLD: 6.1 %
HCT VFR BLD AUTO: 41.5 % (ref 36–46)
HGB BLD-MCNC: 13 G/DL (ref 12–16)
IMM GRANULOCYTES # BLD AUTO: 0.01 X10*3/UL (ref 0–0.7)
IMM GRANULOCYTES NFR BLD AUTO: 0.2 % (ref 0–0.9)
LYMPHOCYTES # BLD AUTO: 3.08 X10*3/UL (ref 1.2–4.8)
LYMPHOCYTES NFR BLD AUTO: 56.8 %
MCH RBC QN AUTO: 26.4 PG (ref 26–34)
MCHC RBC AUTO-ENTMCNC: 31.3 G/DL (ref 32–36)
MCV RBC AUTO: 84 FL (ref 80–100)
MONOCYTES # BLD AUTO: 0.4 X10*3/UL (ref 0.1–1)
MONOCYTES NFR BLD AUTO: 7.4 %
NEUTROPHILS # BLD AUTO: 1.79 X10*3/UL (ref 1.2–7.7)
NEUTROPHILS NFR BLD AUTO: 33.1 %
NRBC BLD-RTO: 0 /100 WBCS (ref 0–0)
PLATELET # BLD AUTO: 308 X10*3/UL (ref 150–450)
POTASSIUM SERPL-SCNC: 4.4 MMOL/L (ref 3.5–5.3)
PROT SERPL-MCNC: 7.6 G/DL (ref 6.4–8.2)
RBC # BLD AUTO: 4.93 X10*6/UL (ref 4–5.2)
SODIUM SERPL-SCNC: 139 MMOL/L (ref 136–145)
WBC # BLD AUTO: 5.4 X10*3/UL (ref 4.4–11.3)

## 2023-12-11 PROCEDURE — 36415 COLL VENOUS BLD VENIPUNCTURE: CPT

## 2023-12-11 PROCEDURE — 1036F TOBACCO NON-USER: CPT | Performed by: INTERNAL MEDICINE

## 2023-12-11 PROCEDURE — 3008F BODY MASS INDEX DOCD: CPT | Performed by: INTERNAL MEDICINE

## 2023-12-11 PROCEDURE — 3075F SYST BP GE 130 - 139MM HG: CPT | Performed by: INTERNAL MEDICINE

## 2023-12-11 PROCEDURE — 80053 COMPREHEN METABOLIC PANEL: CPT

## 2023-12-11 PROCEDURE — 99214 OFFICE O/P EST MOD 30 MIN: CPT | Performed by: INTERNAL MEDICINE

## 2023-12-11 PROCEDURE — 3079F DIAST BP 80-89 MM HG: CPT | Performed by: INTERNAL MEDICINE

## 2023-12-11 PROCEDURE — 85025 COMPLETE CBC W/AUTO DIFF WBC: CPT

## 2023-12-11 PROCEDURE — 83036 HEMOGLOBIN GLYCOSYLATED A1C: CPT

## 2023-12-11 ASSESSMENT — ENCOUNTER SYMPTOMS
MYALGIAS: 0
NUMBNESS: 0
BLOOD IN STOOL: 0
CONSTIPATION: 0
DYSURIA: 0
ABDOMINAL PAIN: 0
FREQUENCY: 0
VOMITING: 0
ABDOMINAL DISTENTION: 0
PALPITATIONS: 0
DIZZINESS: 0
JOINT SWELLING: 0
SHORTNESS OF BREATH: 0
COUGH: 0
LIGHT-HEADEDNESS: 0
ARTHRALGIAS: 0
RHINORRHEA: 0
DIAPHORESIS: 0
APPETITE CHANGE: 0
WHEEZING: 0
HEADACHES: 0
DIFFICULTY URINATING: 0
WEAKNESS: 0
NAUSEA: 0
FEVER: 0
NECK STIFFNESS: 0
FATIGUE: 0
DIARRHEA: 0
BACK PAIN: 0
CHILLS: 0
SORE THROAT: 0
SINUS PRESSURE: 0
HEMATURIA: 0
NECK PAIN: 0

## 2023-12-11 NOTE — PROGRESS NOTES
"Subjective   Patient ID: Paul Hawkins is a 43 y.o. female who presents for Follow-up.    HPI   She presents for follow-up. She has a small bump on the right forehead for a few months which started after hitting her head. She is however unsure of the traumatic event. The bump is not painful. It is not growing in size.     Review of Systems   Constitutional:  Negative for appetite change, chills, diaphoresis, fatigue and fever.   HENT:  Negative for congestion, ear discharge, ear pain, hearing loss, postnasal drip, rhinorrhea, sinus pressure, sore throat and tinnitus.    Eyes:  Negative for visual disturbance.   Respiratory:  Negative for cough, shortness of breath and wheezing.    Cardiovascular:  Negative for chest pain, palpitations and leg swelling.   Gastrointestinal:  Negative for abdominal distention, abdominal pain, blood in stool, constipation, diarrhea, nausea and vomiting.   Genitourinary:  Negative for decreased urine volume, difficulty urinating, dysuria, frequency, hematuria and urgency.   Musculoskeletal:  Negative for arthralgias, back pain, gait problem, joint swelling, myalgias, neck pain and neck stiffness.   Skin:  Negative for rash.   Neurological:  Negative for dizziness, weakness, light-headedness, numbness and headaches.       Objective   /80   Pulse 82   Resp 18   Ht 1.651 m (5' 5\")   Wt 132 kg (290 lb)   SpO2 96%   BMI 48.26 kg/m²     Physical Exam  Vitals reviewed.   Constitutional:       Appearance: Normal appearance. She is normal weight.   HENT:      Right Ear: Tympanic membrane and external ear normal.      Left Ear: Tympanic membrane and external ear normal.   Eyes:      Extraocular Movements: Extraocular movements intact.      Conjunctiva/sclera: Conjunctivae normal.      Pupils: Pupils are equal, round, and reactive to light.   Cardiovascular:      Rate and Rhythm: Normal rate and regular rhythm.      Pulses: Normal pulses.   Pulmonary:      Effort: Pulmonary effort is " normal.      Breath sounds: Normal breath sounds.   Abdominal:      General: Bowel sounds are normal.      Palpations: Abdomen is soft.   Musculoskeletal:         General: Normal range of motion.      Cervical back: Normal range of motion.   Skin:     General: Skin is warm and dry.   Neurological:      General: No focal deficit present.      Mental Status: She is oriented to person, place, and time.   Psychiatric:         Mood and Affect: Mood normal.         Behavior: Behavior normal.         Assessment/Plan   Problem List Items Addressed This Visit             ICD-10-CM    Benign essential HTN - Primary I10     BP within goal in office today  Continue hydrochlorothiazide 12.5 mg daily   Low sodium diet          Relevant Orders    Comprehensive Metabolic Panel    Prediabetes R73.03     Repeat A1C          Relevant Orders    Hemoglobin A1C (Completed)    BMI 45.0-49.9, adult (CMS/HCC) Z68.42     She is interested in GLP1 agonist for weight loss            Relevant Orders    Referral to Nutrition Services    Referral to [facility] (CMS/[facility]) D69.6     Repeat CBC          Relevant Orders    CBC and Auto Differential (Completed)    Benign skin lesion of forehead L98.9     O/E: small 0.5 cm firm nodule on right forehead above eyebrow.  Possible inclusion cyst due to trauma  Discussed referring to plastic surgery or dermatology for removal, patient declining/currently  Will monitor.          Rtc IN 3-4 MONTHS

## 2023-12-11 NOTE — ASSESSMENT & PLAN NOTE
O/E: small 0.5 cm firm nodule on right forehead above eyebrow.  Possible inclusion cyst due to trauma  Discussed referring to plastic surgery or dermatology for removal, patient declining/currently  Will monitor.

## 2023-12-29 ENCOUNTER — DOCUMENTATION (OUTPATIENT)
Dept: PHYSICAL THERAPY | Facility: CLINIC | Age: 43
End: 2023-12-29
Payer: COMMERCIAL

## 2023-12-29 NOTE — PROGRESS NOTES
Physical Therapy    Discharge Summary    Name: Paul Hawkins  MRN: [id number]  : 1980  Date: 23    Discharge Summary: PT    Discharge Information: Date of discharge 23, Date of last visit 11/10/23, Date of evaluation 10/11/23, Number of attended visits 4, Referred by Dr. Mandujano, and Referred for Right knee pain    Therapy Summary: Patient worked on increasing LE strength, flexibility, balance and WB endurance in order to decrease pain.    Discharge Status: At last attended appointment, knee was feeling better but did not attend her last 2 scheduled appointments so unable to get updated measurements.     Rehab Discharge Reason: Failed to schedule and/or keep follow-up appointment(s)

## 2024-01-29 ENCOUNTER — NUTRITION (OUTPATIENT)
Dept: NUTRITION | Facility: CLINIC | Age: 44
End: 2024-01-29
Payer: COMMERCIAL

## 2024-01-29 DIAGNOSIS — E66.01 MORBID OBESITY (MULTI): Primary | ICD-10-CM

## 2024-01-29 PROCEDURE — 97802 MEDICAL NUTRITION INDIV IN: CPT | Performed by: INTERNAL MEDICINE
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
## 2024-02-07 ENCOUNTER — TELEMEDICINE (OUTPATIENT)
Dept: INTEGRATIVE MEDICINE | Facility: CLINIC | Age: 44
End: 2024-02-07
Payer: COMMERCIAL

## 2024-02-07 VITALS — HEIGHT: 65 IN | WEIGHT: 293 LBS | BODY MASS INDEX: 48.82 KG/M2

## 2024-02-07 DIAGNOSIS — G47.00 INSOMNIA, UNSPECIFIED TYPE: ICD-10-CM

## 2024-02-07 DIAGNOSIS — I10 BENIGN ESSENTIAL HTN: ICD-10-CM

## 2024-02-07 DIAGNOSIS — R53.82 CHRONIC FATIGUE: ICD-10-CM

## 2024-02-07 DIAGNOSIS — F41.9 ANXIETY DISORDER, UNSPECIFIED TYPE: ICD-10-CM

## 2024-02-07 DIAGNOSIS — E55.9 VITAMIN D DEFICIENCY: ICD-10-CM

## 2024-02-07 PROCEDURE — 99215 OFFICE O/P EST HI 40 MIN: CPT | Performed by: INTERNAL MEDICINE

## 2024-02-07 PROCEDURE — 3008F BODY MASS INDEX DOCD: CPT | Performed by: INTERNAL MEDICINE

## 2024-02-07 PROCEDURE — 1036F TOBACCO NON-USER: CPT | Performed by: INTERNAL MEDICINE

## 2024-02-07 PROCEDURE — 99417 PROLNG OP E/M EACH 15 MIN: CPT | Performed by: INTERNAL MEDICINE

## 2024-02-07 ASSESSMENT — ENCOUNTER SYMPTOMS
DIARRHEA: 0
HEADACHES: 0
FEVER: 0
APPETITE CHANGE: 0
CONSTIPATION: 1
NERVOUS/ANXIOUS: 0
SLEEP DISTURBANCE: 1
SHORTNESS OF BREATH: 0
MYALGIAS: 0
COUGH: 0
ARTHRALGIAS: 1
WEAKNESS: 0
DYSURIA: 0
BACK PAIN: 0
DIFFICULTY URINATING: 0
FATIGUE: 0
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
## 2024-04-01 ENCOUNTER — APPOINTMENT (OUTPATIENT)
Dept: NUTRITION | Facility: CLINIC | Age: 44
End: 2024-04-01
Payer: COMMERCIAL

## 2024-04-11 ENCOUNTER — APPOINTMENT (OUTPATIENT)
Dept: PRIMARY CARE | Facility: CLINIC | Age: 44
End: 2024-04-11
Payer: COMMERCIAL

## 2024-04-29 ENCOUNTER — LAB (OUTPATIENT)
Dept: LAB | Facility: LAB | Age: 44
End: 2024-04-29
Payer: COMMERCIAL

## 2024-04-29 ENCOUNTER — OFFICE VISIT (OUTPATIENT)
Dept: PRIMARY CARE | Facility: CLINIC | Age: 44
End: 2024-04-29
Payer: COMMERCIAL

## 2024-04-29 VITALS
DIASTOLIC BLOOD PRESSURE: 74 MMHG | RESPIRATION RATE: 18 BRPM | HEIGHT: 64 IN | WEIGHT: 290 LBS | BODY MASS INDEX: 49.51 KG/M2 | SYSTOLIC BLOOD PRESSURE: 128 MMHG | HEART RATE: 78 BPM

## 2024-04-29 DIAGNOSIS — E78.00 HYPERCHOLESTEREMIA: ICD-10-CM

## 2024-04-29 DIAGNOSIS — I10 BENIGN ESSENTIAL HTN: Primary | ICD-10-CM

## 2024-04-29 DIAGNOSIS — I10 BENIGN ESSENTIAL HTN: ICD-10-CM

## 2024-04-29 DIAGNOSIS — D69.6 THROMBOCYTOPENIA (CMS-HCC): ICD-10-CM

## 2024-04-29 DIAGNOSIS — R73.03 PREDIABETES: ICD-10-CM

## 2024-04-29 DIAGNOSIS — Z12.31 ENCOUNTER FOR SCREENING MAMMOGRAM FOR MALIGNANT NEOPLASM OF BREAST: ICD-10-CM

## 2024-04-29 LAB
ALBUMIN SERPL BCP-MCNC: 4.1 G/DL (ref 3.4–5)
ALP SERPL-CCNC: 40 U/L (ref 33–110)
ALT SERPL W P-5'-P-CCNC: 17 U/L (ref 7–45)
ANION GAP SERPL CALC-SCNC: 11 MMOL/L (ref 10–20)
AST SERPL W P-5'-P-CCNC: 12 U/L (ref 9–39)
BASOPHILS # BLD AUTO: 0.04 X10*3/UL (ref 0–0.1)
BASOPHILS NFR BLD AUTO: 0.5 %
BILIRUB SERPL-MCNC: 0.2 MG/DL (ref 0–1.2)
BUN SERPL-MCNC: 17 MG/DL (ref 6–23)
CALCIUM SERPL-MCNC: 9.3 MG/DL (ref 8.6–10.6)
CHLORIDE SERPL-SCNC: 101 MMOL/L (ref 98–107)
CHOLEST SERPL-MCNC: 189 MG/DL (ref 0–199)
CHOLESTEROL/HDL RATIO: 4.7
CO2 SERPL-SCNC: 29 MMOL/L (ref 21–32)
CREAT SERPL-MCNC: 0.52 MG/DL (ref 0.5–1.05)
EGFRCR SERPLBLD CKD-EPI 2021: >90 ML/MIN/1.73M*2
EOSINOPHIL # BLD AUTO: 0.09 X10*3/UL (ref 0–0.7)
EOSINOPHIL NFR BLD AUTO: 1.2 %
ERYTHROCYTE [DISTWIDTH] IN BLOOD BY AUTOMATED COUNT: 14.8 % (ref 11.5–14.5)
EST. AVERAGE GLUCOSE BLD GHB EST-MCNC: 131 MG/DL
GLUCOSE SERPL-MCNC: 113 MG/DL (ref 74–99)
HBA1C MFR BLD: 6.2 %
HCT VFR BLD AUTO: 40.9 % (ref 36–46)
HDLC SERPL-MCNC: 39.9 MG/DL
HGB BLD-MCNC: 12.9 G/DL (ref 12–16)
IMM GRANULOCYTES # BLD AUTO: 0.02 X10*3/UL (ref 0–0.7)
IMM GRANULOCYTES NFR BLD AUTO: 0.3 % (ref 0–0.9)
LDLC SERPL DIRECT ASSAY-MCNC: 137 MG/DL (ref 0–129)
LYMPHOCYTES # BLD AUTO: 3.5 X10*3/UL (ref 1.2–4.8)
LYMPHOCYTES NFR BLD AUTO: 47 %
MCH RBC QN AUTO: 26.7 PG (ref 26–34)
MCHC RBC AUTO-ENTMCNC: 31.5 G/DL (ref 32–36)
MCV RBC AUTO: 85 FL (ref 80–100)
MONOCYTES # BLD AUTO: 0.56 X10*3/UL (ref 0.1–1)
MONOCYTES NFR BLD AUTO: 7.5 %
NEUTROPHILS # BLD AUTO: 3.24 X10*3/UL (ref 1.2–7.7)
NEUTROPHILS NFR BLD AUTO: 43.5 %
NON-HDL CHOLESTEROL: 149 MG/DL (ref 0–149)
NRBC BLD-RTO: 0 /100 WBCS (ref 0–0)
PLATELET # BLD AUTO: 289 X10*3/UL (ref 150–450)
POTASSIUM SERPL-SCNC: 4.2 MMOL/L (ref 3.5–5.3)
PROT SERPL-MCNC: 7.1 G/DL (ref 6.4–8.2)
RBC # BLD AUTO: 4.83 X10*6/UL (ref 4–5.2)
SODIUM SERPL-SCNC: 137 MMOL/L (ref 136–145)
WBC # BLD AUTO: 7.5 X10*3/UL (ref 4.4–11.3)

## 2024-04-29 PROCEDURE — 36415 COLL VENOUS BLD VENIPUNCTURE: CPT

## 2024-04-29 PROCEDURE — 3074F SYST BP LT 130 MM HG: CPT | Performed by: INTERNAL MEDICINE

## 2024-04-29 PROCEDURE — 99214 OFFICE O/P EST MOD 30 MIN: CPT | Performed by: INTERNAL MEDICINE

## 2024-04-29 PROCEDURE — 3008F BODY MASS INDEX DOCD: CPT | Performed by: INTERNAL MEDICINE

## 2024-04-29 PROCEDURE — 3078F DIAST BP <80 MM HG: CPT | Performed by: INTERNAL MEDICINE

## 2024-04-29 PROCEDURE — 85025 COMPLETE CBC W/AUTO DIFF WBC: CPT

## 2024-04-29 PROCEDURE — 80053 COMPREHEN METABOLIC PANEL: CPT

## 2024-04-29 PROCEDURE — 83721 ASSAY OF BLOOD LIPOPROTEIN: CPT

## 2024-04-29 PROCEDURE — 83036 HEMOGLOBIN GLYCOSYLATED A1C: CPT

## 2024-04-29 PROCEDURE — 82465 ASSAY BLD/SERUM CHOLESTEROL: CPT

## 2024-04-29 PROCEDURE — 1036F TOBACCO NON-USER: CPT | Performed by: INTERNAL MEDICINE

## 2024-04-29 PROCEDURE — 83718 ASSAY OF LIPOPROTEIN: CPT

## 2024-04-29 ASSESSMENT — ENCOUNTER SYMPTOMS
FATIGUE: 0
DYSURIA: 0
HEMATURIA: 0
OCCASIONAL FEELINGS OF UNSTEADINESS: 0
NAUSEA: 0
PALPITATIONS: 0
DIFFICULTY URINATING: 0
BLOOD IN STOOL: 0
DIZZINESS: 0
BACK PAIN: 0
ABDOMINAL PAIN: 0
CHILLS: 0
CONSTIPATION: 0
DIARRHEA: 0
ABDOMINAL DISTENTION: 0
WEAKNESS: 0
FEVER: 0
SINUS PRESSURE: 0
FREQUENCY: 0
RHINORRHEA: 0
NUMBNESS: 0
MYALGIAS: 0
DIAPHORESIS: 0
ARTHRALGIAS: 0
NECK PAIN: 0
LIGHT-HEADEDNESS: 0
APPETITE CHANGE: 0
HEADACHES: 0
JOINT SWELLING: 0
COUGH: 0
WHEEZING: 0
DEPRESSION: 0
NECK STIFFNESS: 0
LOSS OF SENSATION IN FEET: 0
SORE THROAT: 0
SHORTNESS OF BREATH: 0
VOMITING: 0

## 2024-04-29 ASSESSMENT — PATIENT HEALTH QUESTIONNAIRE - PHQ9
SUM OF ALL RESPONSES TO PHQ9 QUESTIONS 1 AND 2: 0
1. LITTLE INTEREST OR PLEASURE IN DOING THINGS: NOT AT ALL
2. FEELING DOWN, DEPRESSED OR HOPELESS: NOT AT ALL

## 2024-04-29 NOTE — ASSESSMENT & PLAN NOTE
-BP above target goal 130/80  -CMP, TSH ordered  -Keep BP log  -Low sodium diet, regular exercise recommended

## 2024-04-29 NOTE — PROGRESS NOTES
"Subjective   Patient ID: Paul Hawkins is a 44 y.o. female who presents for Follow-up.    HPI   She is doing well generally.     Review of Systems   Constitutional:  Negative for appetite change, chills, diaphoresis, fatigue and fever.   HENT:  Negative for congestion, ear discharge, ear pain, hearing loss, postnasal drip, rhinorrhea, sinus pressure, sore throat and tinnitus.    Eyes:  Negative for visual disturbance.   Respiratory:  Negative for cough, shortness of breath and wheezing.    Cardiovascular:  Negative for chest pain, palpitations and leg swelling.   Gastrointestinal:  Negative for abdominal distention, abdominal pain, blood in stool, constipation, diarrhea, nausea and vomiting.   Genitourinary:  Negative for decreased urine volume, difficulty urinating, dysuria, frequency, hematuria and urgency.   Musculoskeletal:  Negative for arthralgias, back pain, gait problem, joint swelling, myalgias, neck pain and neck stiffness.   Skin:  Negative for rash.   Neurological:  Negative for dizziness, weakness, light-headedness, numbness and headaches.       Objective   /74   Pulse 78   Resp 18   Ht 1.632 m (5' 4.25\")   Wt 132 kg (290 lb)   BMI 49.39 kg/m²     Physical Exam  Vitals reviewed.   Constitutional:       Appearance: Normal appearance. She is normal weight.   HENT:      Right Ear: Tympanic membrane and external ear normal.      Left Ear: Tympanic membrane and external ear normal.   Eyes:      Extraocular Movements: Extraocular movements intact.      Conjunctiva/sclera: Conjunctivae normal.      Pupils: Pupils are equal, round, and reactive to light.   Cardiovascular:      Rate and Rhythm: Normal rate and regular rhythm.      Pulses: Normal pulses.   Pulmonary:      Effort: Pulmonary effort is normal.      Breath sounds: Normal breath sounds.   Abdominal:      General: Bowel sounds are normal.      Palpations: Abdomen is soft.   Musculoskeletal:         General: Normal range of motion.      " Cervical back: Normal range of motion.   Skin:     General: Skin is warm and dry.   Neurological:      General: No focal deficit present.      Mental Status: She is oriented to person, place, and time.   Psychiatric:         Mood and Affect: Mood normal.         Behavior: Behavior normal.         Assessment/Plan   Problem List Items Addressed This Visit             ICD-10-CM    Benign essential HTN - Primary I10     -BP above target goal 130/80  -CMP, TSH ordered  -Keep BP log  -Low sodium diet, regular exercise recommended           Relevant Orders    CBC and Auto Differential    Comprehensive Metabolic Panel    Hypercholesteremia E78.00     Repeat lipid panel  Continue rosuvastatin 20 mg daily          Relevant Orders    Lipid Panel Non-Fasting    Cholesterol, LDL Direct    Prediabetes R73.03    Relevant Orders    Hemoglobin A1C    Encounter for screening mammogram for malignant neoplasm of breast Z12.31    Relevant Orders    BI mammo bilateral screening tomosynthesis    Thrombocytopenia (CMS-HCC) D69.6     Repeat CBC         RTC in 6 mo

## 2024-10-08 ENCOUNTER — HOSPITAL ENCOUNTER (OUTPATIENT)
Dept: RADIOLOGY | Facility: CLINIC | Age: 44
Discharge: HOME | End: 2024-10-08
Payer: COMMERCIAL

## 2024-10-08 VITALS — BODY MASS INDEX: 49.68 KG/M2 | HEIGHT: 64 IN | WEIGHT: 291.01 LBS

## 2024-10-08 DIAGNOSIS — Z12.31 ENCOUNTER FOR SCREENING MAMMOGRAM FOR MALIGNANT NEOPLASM OF BREAST: ICD-10-CM

## 2024-10-08 PROCEDURE — 77067 SCR MAMMO BI INCL CAD: CPT

## 2024-10-08 PROCEDURE — 77063 BREAST TOMOSYNTHESIS BI: CPT | Performed by: RADIOLOGY

## 2024-10-08 PROCEDURE — 77067 SCR MAMMO BI INCL CAD: CPT | Performed by: RADIOLOGY

## 2024-10-15 ENCOUNTER — APPOINTMENT (OUTPATIENT)
Dept: CARDIOLOGY | Facility: CLINIC | Age: 44
End: 2024-10-15
Payer: COMMERCIAL

## 2024-10-17 ENCOUNTER — OFFICE VISIT (OUTPATIENT)
Dept: CARDIOLOGY | Facility: CLINIC | Age: 44
End: 2024-10-17
Payer: COMMERCIAL

## 2024-10-17 VITALS
SYSTOLIC BLOOD PRESSURE: 138 MMHG | HEIGHT: 64 IN | BODY MASS INDEX: 50.02 KG/M2 | DIASTOLIC BLOOD PRESSURE: 70 MMHG | RESPIRATION RATE: 18 BRPM | HEART RATE: 84 BPM | WEIGHT: 293 LBS | OXYGEN SATURATION: 98 %

## 2024-10-17 DIAGNOSIS — R07.9 CHEST PAIN, UNSPECIFIED TYPE: Primary | ICD-10-CM

## 2024-10-17 DIAGNOSIS — R63.5 WEIGHT GAIN: ICD-10-CM

## 2024-10-17 DIAGNOSIS — R73.03 PRE-DIABETES: ICD-10-CM

## 2024-10-17 PROCEDURE — 99213 OFFICE O/P EST LOW 20 MIN: CPT

## 2024-10-17 PROCEDURE — 93005 ELECTROCARDIOGRAM TRACING: CPT

## 2024-10-17 RX ORDER — REGADENOSON 0.08 MG/ML
0.4 INJECTION, SOLUTION INTRAVENOUS
OUTPATIENT
Start: 2024-10-17

## 2024-10-17 ASSESSMENT — ENCOUNTER SYMPTOMS
WEIGHT GAIN: 1
DYSPNEA ON EXERTION: 1

## 2024-10-17 ASSESSMENT — PATIENT HEALTH QUESTIONNAIRE - PHQ9
1. LITTLE INTEREST OR PLEASURE IN DOING THINGS: NOT AT ALL
2. FEELING DOWN, DEPRESSED OR HOPELESS: NOT AT ALL
SUM OF ALL RESPONSES TO PHQ9 QUESTIONS 1 & 2: 0

## 2024-10-17 ASSESSMENT — PAIN SCALES - GENERAL: PAINLEVEL_OUTOF10: 0-NO PAIN

## 2024-10-17 NOTE — PATIENT INSTRUCTIONS
Paul,    Echocardiogram     Sleep Medicine Provider please schedule [phone]    Blood work ( fasting 12 hours )    Nuclear PET Stress ( avoid caffeine 12 hours before) nothing to eat 4 hours before may take medications with sips of water. [phone]    5 week elvira SHEEHAN-CNP

## 2024-10-17 NOTE — PROGRESS NOTES
Referred by  Self Referral New Patient Visit (For chest tightness on and off x 1 month. Brought on by stress. SOB with pain. Lympth node swelling in neck and arm pits.)     History Of Present Illness:    Paul Hawkins is a 44 y.o. female who is a  with a PMH of ANALY (CPAP), pre-diabetes presenting with c/o chest pain .    She is here today with her . She reports that she has been having mid sternal chest pain started a couple of months ago . Typically occurs when she is stressed. She describes it as tightness and someone is standing on mid chest. She also reports ARRIOLA with activity .  She also reports to gaining weight. Patient denies chest pain and angina.  Pt denies orthopnea. Pt denies palpitations or syncope.  No recent falls.  No fever or chills.  No cough.  No change in bowel or bladder habits.  No sick contacts.  No recent travel.     Review of Systems   Constitutional: Positive for weight gain.   Cardiovascular:  Positive for chest pain, dyspnea on exertion and leg swelling.   All other systems reviewed and are negative.    Past Medical History:  She has a past medical history of Personal history of other diseases of the digestive system (07/06/2017) and Personal history of other endocrine, nutritional and metabolic disease (04/26/2017).    Past Surgical History:  She has a past surgical history that includes Other surgical history (08/05/2015); Oophorectomy (08/05/2015); Other surgical history (08/05/2015); and Other surgical history (04/26/2017).      Social History:  She reports that she has never smoked. She has never been exposed to tobacco smoke. She has never used smokeless tobacco. She reports that she does not drink alcohol and does not use drugs.    Family History:  Patient is adopted. Reports Grandmother CHF unsure age.      Allergies:  Penicillins    Outpatient Medications:  Current Outpatient Medications   Medication Instructions    blood-gluc,BP meter,adult cuff device Try to  "check BP daily alternating times of day    blood-glucose meter misc FreeStyle Lite Device FreeStyle Lite Device USE AS DIRECTED. Quantity: 1 Refills: 0 Zurdo Parmar MD Start : 30-Oct-2018 Active 10- Zurdo Parmar [facility] (40[address])    cetirizine (ALLERGY RELIEF (CETIRIZINE)) 10 mg    cholecalciferol (VITAMIN D-3) 5,000 Units, Daily RT    fluticasone (Flonase) 50 mcg/actuation nasal spray Administer into affected nostril(s).    FreeStyle Lite Strips strip Daily RT    NON FORMULARY Lcarnitina 1000mg once a day     rosuvastatin (CRESTOR) 20 mg, oral, Daily        Last Recorded Vitals:  Vitals:    10/17/24 0926   BP: 138/70   BP Location: Right arm   Patient Position: Sitting   BP Cuff Size: Large adult   Pulse: 84   Resp: 18   SpO2: 98%   Weight: 135 kg (297 lb)   Height: 1.626 m (5' 4\")       Physical Exam  Constitutional:       Appearance: Normal appearance. She is morbidly obese.   Neck:      Vascular: No carotid bruit.   Cardiovascular:      Rate and Rhythm: Normal rate and regular rhythm.      Heart sounds: No murmur heard.  Pulmonary:      Effort: Pulmonary effort is normal.      Breath sounds: Normal breath sounds.   Abdominal:      Palpations: Abdomen is soft.   Musculoskeletal:      Right lower leg: Edema (+1 pitting) present.      Left lower leg: Edema (+1 pitting) present.   Skin:     General: Skin is warm.   Neurological:      Mental Status: She is alert and oriented to person, place, and time.   Psychiatric:         Mood and Affect: Mood normal.       Last Labs:  CBC -  Lab Results   Component Value Date    WBC 7.5 04/29/2024    HGB 12.9 04/29/2024    HCT 40.9 04/29/2024    MCV 85 04/29/2024     04/29/2024       CMP -  Lab Results   Component Value Date    CALCIUM 9.3 04/29/2024    PROT 7.1 04/29/2024    ALBUMIN 4.1 04/29/2024    AST 12 04/29/2024    ALT 17 04/29/2024    ALKPHOS 40 04/29/2024    BILITOT 0.2 04/29/2024       LIPID PANEL -   Lab Results "   Component Value Date    CHOL 189 04/29/2024    TRIG 147 07/26/2023    HDL 39.9 04/29/2024    CHHDL 4.7 04/29/2024    LDLF 105 (H) 07/26/2023    VLDL 29 07/26/2023       RENAL FUNCTION PANEL -   Lab Results   Component Value Date    GLUCOSE 113 (H) 04/29/2024     04/29/2024    K 4.2 04/29/2024     04/29/2024    CO2 29 04/29/2024    ANIONGAP 11 04/29/2024    BUN 17 04/29/2024    CREATININE 0.52 04/29/2024    CALCIUM 9.3 04/29/2024    ALBUMIN 4.1 04/29/2024        Lab Results   Component Value Date    HGBA1C 6.2 (H) 04/29/2024       Last Cardiology Tests:  ECG:  Assessment/Plan   Paul Hawkins is a 44 y.o. female who is a  with a PMH of ANALY (CPAP), pre-diabetes presenting with c/o chest pain .    She is here today with her . She reports that she has been having mid sternal chest pain started a couple of months ago . Typically occurs when she is stressed. She describes it as tightness and someone is standing on mid chest. She also reports ARRIOLA with activity .  She also reports to gaining weight at least 7 pounds in the last several months. Today she appears euvolemic and is normotensive.     -Echocardiogram     -Sleep Medicine Provider please schedule [phone]    - fasting 12 hours Lipid Panel, Hemoglobin A1C, BMP, TSH with reflexive T4    -Nuclear PET Stress (BMI > 50 and c/o chest pain )    -5 week virtual    Doretha Quiñonez APRN-CNP         Doretha Quiñonez, APRN-CNP

## 2024-10-25 LAB
ATRIAL RATE: 69 BPM
P AXIS: 57 DEGREES
P OFFSET: 194 MS
P ONSET: 133 MS
PR INTERVAL: 178 MS
Q ONSET: 222 MS
QRS COUNT: 12 BEATS
QRS DURATION: 94 MS
QT INTERVAL: 410 MS
QTC CALCULATION(BAZETT): 439 MS
QTC FREDERICIA: 429 MS
R AXIS: 77 DEGREES
T AXIS: 2 DEGREES
T OFFSET: 427 MS
VENTRICULAR RATE: 69 BPM

## 2024-11-01 ENCOUNTER — APPOINTMENT (OUTPATIENT)
Dept: INTEGRATIVE MEDICINE | Facility: CLINIC | Age: 44
End: 2024-11-01
Payer: COMMERCIAL

## 2024-11-01 DIAGNOSIS — M99.02 SEGMENTAL AND SOMATIC DYSFUNCTION OF THORACIC REGION: ICD-10-CM

## 2024-11-01 DIAGNOSIS — M79.10 MYALGIA: ICD-10-CM

## 2024-11-01 DIAGNOSIS — M79.9 POSTURAL STRAIN: ICD-10-CM

## 2024-11-01 DIAGNOSIS — M54.2 CERVICALGIA: ICD-10-CM

## 2024-11-01 DIAGNOSIS — M99.03 SEGMENTAL AND SOMATIC DYSFUNCTION OF LUMBAR REGION: Primary | ICD-10-CM

## 2024-11-01 DIAGNOSIS — M99.05 SEGMENTAL AND SOMATIC DYSFUNCTION OF PELVIC REGION: ICD-10-CM

## 2024-11-01 DIAGNOSIS — M54.59 MECHANICAL LOW BACK PAIN: ICD-10-CM

## 2024-11-01 DIAGNOSIS — M99.01 SEGMENTAL AND SOMATIC DYSFUNCTION OF CERVICAL REGION: ICD-10-CM

## 2024-11-01 PROCEDURE — 98941 CHIROPRACT MANJ 3-4 REGIONS: CPT | Performed by: CHIROPRACTOR

## 2024-11-01 PROCEDURE — 99203 OFFICE O/P NEW LOW 30 MIN: CPT | Performed by: CHIROPRACTOR
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
## 2024-11-04 ENCOUNTER — APPOINTMENT (OUTPATIENT)
Dept: PRIMARY CARE | Facility: CLINIC | Age: 44
End: 2024-11-04
Payer: COMMERCIAL

## 2024-11-06 ASSESSMENT — ENCOUNTER SYMPTOMS
UNEXPECTED WEIGHT CHANGE: 0
DIZZINESS: 0
VOMITING: 0
SHORTNESS OF BREATH: 0
LIGHT-HEADEDNESS: 0
ARTHRALGIAS: 0
NUMBNESS: 1
FLANK PAIN: 0
NECK PAIN: 1
HEADACHES: 0
NECK STIFFNESS: 1
FATIGUE: 0
CHILLS: 0
DIFFICULTY URINATING: 0
TROUBLE SWALLOWING: 0
BACK PAIN: 1
FEVER: 0
MYALGIAS: 1
WEAKNESS: 0
JOINT SWELLING: 0
CHEST TIGHTNESS: 0

## 2024-11-07 ENCOUNTER — APPOINTMENT (OUTPATIENT)
Dept: OBSTETRICS AND GYNECOLOGY | Facility: CLINIC | Age: 44
End: 2024-11-07
Payer: COMMERCIAL

## 2024-11-14 ENCOUNTER — ANCILLARY PROCEDURE (OUTPATIENT)
Dept: CARDIOLOGY | Facility: CLINIC | Age: 44
End: 2024-11-14
Payer: COMMERCIAL

## 2024-11-14 DIAGNOSIS — R07.9 CHEST PAIN, UNSPECIFIED TYPE: ICD-10-CM

## 2024-11-14 DIAGNOSIS — R06.09 OTHER FORMS OF DYSPNEA: ICD-10-CM

## 2024-11-14 LAB
AORTIC VALVE PEAK VELOCITY: 1.23 M/S
AV PEAK GRADIENT: 6 MMHG
AVA (PEAK VEL): 1.9 CM2
EJECTION FRACTION APICAL 4 CHAMBER: 52.8
EJECTION FRACTION: 56 %
LEFT ATRIUM VOLUME AREA LENGTH INDEX BSA: 31.6 ML/M2
LEFT VENTRICLE INTERNAL DIMENSION DIASTOLE: 4.77 CM (ref 3.5–6)
LEFT VENTRICULAR OUTFLOW TRACT DIAMETER: 2.08 CM
MITRAL VALVE E/A RATIO: 1.63
RIGHT VENTRICLE FREE WALL PEAK S': 11 CM/S
TRICUSPID ANNULAR PLANE SYSTOLIC EXCURSION: 1.9 CM

## 2024-11-14 PROCEDURE — 93306 TTE W/DOPPLER COMPLETE: CPT

## 2024-11-14 PROCEDURE — 93306 TTE W/DOPPLER COMPLETE: CPT | Performed by: STUDENT IN AN ORGANIZED HEALTH CARE EDUCATION/TRAINING PROGRAM

## 2024-11-15 ENCOUNTER — HOSPITAL ENCOUNTER (OUTPATIENT)
Dept: RADIOLOGY | Facility: HOSPITAL | Age: 44
Discharge: HOME | End: 2024-11-15
Payer: COMMERCIAL

## 2024-11-15 ENCOUNTER — APPOINTMENT (OUTPATIENT)
Dept: CARDIOLOGY | Facility: HOSPITAL | Age: 44
End: 2024-11-15
Payer: COMMERCIAL

## 2024-11-15 DIAGNOSIS — R07.9 CHEST PAIN, UNSPECIFIED TYPE: ICD-10-CM

## 2024-11-15 PROCEDURE — 3430000001 HC RX 343 DIAGNOSTIC RADIOPHARMACEUTICALS

## 2024-11-15 PROCEDURE — 78492 MYOCRD IMG PET MLT RST&STRS: CPT

## 2024-11-15 PROCEDURE — 93017 CV STRESS TEST TRACING ONLY: CPT

## 2024-11-15 PROCEDURE — 93016 CV STRESS TEST SUPVJ ONLY: CPT | Performed by: INTERNAL MEDICINE

## 2024-11-15 PROCEDURE — A9526 NITROGEN N-13 AMMONIA: HCPCS

## 2024-11-15 PROCEDURE — 93018 CV STRESS TEST I&R ONLY: CPT | Performed by: INTERNAL MEDICINE

## 2024-11-15 RX ORDER — AMMONIA N-13 37.5 MCI/ML
13.9 INJECTION INTRAVENOUS
Status: COMPLETED | OUTPATIENT
Start: 2024-11-15 | End: 2024-11-15

## 2024-11-15 RX ORDER — AMMONIA N-13 37.5 MCI/ML
13 INJECTION INTRAVENOUS
Status: COMPLETED | OUTPATIENT
Start: 2024-11-15 | End: 2024-11-15

## 2024-11-20 ENCOUNTER — LAB (OUTPATIENT)
Dept: LAB | Facility: LAB | Age: 44
End: 2024-11-20
Payer: COMMERCIAL

## 2024-11-20 DIAGNOSIS — R07.9 CHEST PAIN, UNSPECIFIED TYPE: ICD-10-CM

## 2024-11-20 DIAGNOSIS — R63.5 WEIGHT GAIN: ICD-10-CM

## 2024-11-20 DIAGNOSIS — R73.03 PRE-DIABETES: ICD-10-CM

## 2024-11-20 LAB
ANION GAP SERPL CALC-SCNC: 14 MMOL/L (ref 10–20)
BUN SERPL-MCNC: 16 MG/DL (ref 6–23)
CALCIUM SERPL-MCNC: 9.2 MG/DL (ref 8.6–10.6)
CHLORIDE SERPL-SCNC: 98 MMOL/L (ref 98–107)
CHOLEST SERPL-MCNC: 211 MG/DL (ref 0–199)
CHOLESTEROL/HDL RATIO: 4.8
CO2 SERPL-SCNC: 28 MMOL/L (ref 21–32)
CREAT SERPL-MCNC: 0.58 MG/DL (ref 0.5–1.05)
EGFRCR SERPLBLD CKD-EPI 2021: >90 ML/MIN/1.73M*2
EST. AVERAGE GLUCOSE BLD GHB EST-MCNC: 146 MG/DL
GLUCOSE SERPL-MCNC: 118 MG/DL (ref 74–99)
HBA1C MFR BLD: 6.7 %
HDLC SERPL-MCNC: 43.8 MG/DL
LDLC SERPL CALC-MCNC: 149 MG/DL
NON HDL CHOLESTEROL: 167 MG/DL (ref 0–149)
POTASSIUM SERPL-SCNC: 4.2 MMOL/L (ref 3.5–5.3)
SODIUM SERPL-SCNC: 136 MMOL/L (ref 136–145)
TRIGL SERPL-MCNC: 91 MG/DL (ref 0–149)
TSH SERPL-ACNC: 3.11 MIU/L (ref 0.44–3.98)
VLDL: 18 MG/DL (ref 0–40)

## 2024-11-20 PROCEDURE — 80048 BASIC METABOLIC PNL TOTAL CA: CPT

## 2024-11-20 PROCEDURE — 84443 ASSAY THYROID STIM HORMONE: CPT

## 2024-11-20 PROCEDURE — 80061 LIPID PANEL: CPT

## 2024-11-20 PROCEDURE — 36415 COLL VENOUS BLD VENIPUNCTURE: CPT

## 2024-11-20 PROCEDURE — 83036 HEMOGLOBIN GLYCOSYLATED A1C: CPT

## 2024-11-21 ENCOUNTER — SPECIALTY PHARMACY (OUTPATIENT)
Dept: PHARMACY | Facility: CLINIC | Age: 44
End: 2024-11-21

## 2024-11-21 ENCOUNTER — TELEMEDICINE (OUTPATIENT)
Dept: CARDIOLOGY | Facility: CLINIC | Age: 44
End: 2024-11-21
Payer: COMMERCIAL

## 2024-11-21 DIAGNOSIS — E78.2 MIXED HYPERLIPIDEMIA: ICD-10-CM

## 2024-11-21 DIAGNOSIS — E11.9 DIABETES MELLITUS TYPE II, NON INSULIN DEPENDENT (MULTI): Primary | ICD-10-CM

## 2024-11-21 PROCEDURE — 99214 OFFICE O/P EST MOD 30 MIN: CPT

## 2024-11-21 PROCEDURE — 3050F LDL-C >= 130 MG/DL: CPT

## 2024-11-21 PROCEDURE — 3044F HG A1C LEVEL LT 7.0%: CPT

## 2024-11-21 RX ORDER — ROSUVASTATIN CALCIUM 20 MG/1
20 TABLET, COATED ORAL DAILY
Qty: 90 TABLET | Refills: 1 | Status: SHIPPED | OUTPATIENT
Start: 2024-11-21

## 2024-11-21 NOTE — PROGRESS NOTES
Virtual or Telephone Consent    An interactive audio and video telecommunication system which permits real time communications between the patient (at the originating site) and provider (at the distant site) was utilized to provide this telehealth service.   Verbal consent was requested and obtained from Paul Hawkins on this date, 11/21/24 for a telehealth visit.       Chief Complaint:   FUV lab work and Nuclear Pet Stress     History Of Present Illness:    Paul Hawkins is a 44 y.o. female who is a  with a PMH of ANALY (CPAP), pre-diabetes presenting with c/o chest pain .     Today we discussed the results of the Nuclear PET stress, echocardiogram and lab work.  Patient denies chest pain and angina.  Pt denies shortness of breath, dyspnea on exertion, orthopnea, and paroxysmal nocturnal dyspnea.  Pt denies worsening lower extremity edema.  Pt denies palpitations or syncope.  No recent falls.  No fever or chills.  No cough.  No change in bowel or bladder habits.  No sick contacts.  No recent travel.    Review of Systems   All other systems reviewed and are negative.    Last Recorded Vitals:  There were no vitals filed for this visit.    Past Medical History:  She has a past medical history of Personal history of other diseases of the digestive system (07/06/2017) and Personal history of other endocrine, nutritional and metabolic disease (04/26/2017).    Past Surgical History:  She has a past surgical history that includes Other surgical history (08/05/2015); Oophorectomy (08/05/2015); Other surgical history (08/05/2015); and Other surgical history (04/26/2017).      Social History:  She reports that she has never smoked. She has never been exposed to tobacco smoke. She has never used smokeless tobacco. She reports that she does not drink alcohol and does not use drugs.    Family History:  No family history on file.     Allergies:  Penicillins    Outpatient Medications:  Current Outpatient Medications    Medication Instructions    blood-gluc,BP meter,adult cuff device Try to check BP daily alternating times of day    blood-glucose meter misc FreeStyle Lite Device FreeStyle Lite Device USE AS DIRECTED. Quantity: 1 Refills: 0 Zurdo Parmar MD Start : 30-Oct-2018 Active 10- [id number]    cetirizine (ALLERGY RELIEF (CETIRIZINE)) 10 mg    cholecalciferol (VITAMIN D-3) 5,000 Units, Daily RT    fluticasone (Flonase) 50 mcg/actuation nasal spray Administer into affected nostril(s).    FreeStyle Lite Strips strip Daily RT    NON FORMULARY Lcarnitina 1000mg once a day     rosuvastatin (CRESTOR) 20 mg, oral, Daily    semaglutide (OZEMPIC) 0.25 mg, subcutaneous, Weekly       Physical Exam  Constitutional:       Appearance: Normal appearance.   Neck:      Vascular: No carotid bruit.   Cardiovascular:      Rate and Rhythm: Normal rate and regular rhythm.      Heart sounds: No murmur heard.  Pulmonary:      Effort: Pulmonary effort is normal.      Breath sounds: Normal breath sounds.   Abdominal:      Palpations: Abdomen is soft.   Skin:     General: Skin is warm.   Neurological:      Mental Status: She is alert and oriented to person, place, and time.   Psychiatric:         Mood and Affect: Mood normal.         Last Labs:  CBC -  Lab Results   Component Value Date    WBC 7.5 04/29/2024    HGB 12.9 04/29/2024    HCT 40.9 04/29/2024    MCV 85 04/29/2024     04/29/2024       CMP -  Lab Results   Component Value Date    CALCIUM 9.2 11/20/2024    PROT 7.1 04/29/2024    ALBUMIN 4.1 04/29/2024    AST 12 04/29/2024    ALT 17 04/29/2024    ALKPHOS 40 04/29/2024    BILITOT 0.2 04/29/2024       LIPID PANEL -   Lab Results   Component Value Date    CHOL 211 (H) 11/20/2024    TRIG 91 11/20/2024    HDL 43.8 11/20/2024    CHHDL 4.8 11/20/2024    LDLF 105 (H) 07/26/2023    VLDL 18 11/20/2024    NHDL 167 (H) 11/20/2024       RENAL FUNCTION PANEL -   Lab Results   Component Value  Date    GLUCOSE 118 (H) 11/20/2024     11/20/2024    K 4.2 11/20/2024    CL 98 11/20/2024    CO2 28 11/20/2024    ANIONGAP 14 11/20/2024    BUN 16 11/20/2024    CREATININE 0.58 11/20/2024    CALCIUM 9.2 11/20/2024    ALBUMIN 4.1 04/29/2024        Lab Results   Component Value Date    HGBA1C 6.7 (H) 11/20/2024       Last Cardiology Tests:  ECG:  ECG 12 lead (Clinic Performed) 10/17/2024    Echo:  Transthoracic echo (TTE) complete 11/14/2024  CONCLUSIONS:   1. Left ventricular ejection fraction is normal, calculated by Mann's biplane at 56%.   2. There is normal right ventricular global systolic function.       Ejection Fractions:  EF   Date/Time Value Ref Range Status   11/14/2024 10:09 AM 56 %      Nuclear PET Cardiac Imaging:  FINDINGS:  Stress and rest images both demonstrate a normal distribution of  perfusion throughout all LV segments with no sign of inducible  myocardial ischemia or previous myocardial scarring.      The LV chamber is dilated with EDV of 168 mL.      ECG-gated rest and post-stress images demonstrate normal myocardial  contractility with an LV ejection fraction of 55 % (normal above 45%).      IMPRESSION:  1.     No evidence of inducible myocardial ischemia or prior  infarction.  2.     Dilated LV chamber.  3.     Normal LV contractility, with LVEF of 55%.    Assessment/Plan   Paul Hawkins is a 44 y.o. female who is a  with a PMH of ANALY (CPAP), Type 2 diabetes presenting with c/o chest pain .     Today we discussed the results of the Nuclear PET stress, echocardiogram and lab work. Patient denies cardiac complaints at this time. She is now diabetic and would like to consider a drug that will help with diabetes as well as weight loss.     Will have her start Ozempic 0.25 subcutaneous once every on the same day. She denies family history of thyroid cancer and denies a history of pancreatitis. LDL not at goal. Will have her start Rosuvastatin 20 mg daily     Follow up in 1  month virtually     Doretha Quiñonez, APRN-CNP

## 2024-12-06 ENCOUNTER — APPOINTMENT (OUTPATIENT)
Dept: INTEGRATIVE MEDICINE | Facility: CLINIC | Age: 44
End: 2024-12-06
Payer: COMMERCIAL

## 2024-12-13 ENCOUNTER — APPOINTMENT (OUTPATIENT)
Dept: INTEGRATIVE MEDICINE | Facility: CLINIC | Age: 44
End: 2024-12-13
Payer: COMMERCIAL

## 2024-12-19 ENCOUNTER — APPOINTMENT (OUTPATIENT)
Dept: OBSTETRICS AND GYNECOLOGY | Facility: CLINIC | Age: 44
End: 2024-12-19
Payer: COMMERCIAL

## 2024-12-20 ENCOUNTER — APPOINTMENT (OUTPATIENT)
Dept: CARDIOLOGY | Facility: CLINIC | Age: 44
End: 2024-12-20
Payer: COMMERCIAL

## 2024-12-20 ENCOUNTER — APPOINTMENT (OUTPATIENT)
Dept: INTEGRATIVE MEDICINE | Facility: CLINIC | Age: 44
End: 2024-12-20
Payer: COMMERCIAL

## 2024-12-27 ENCOUNTER — TELEMEDICINE (OUTPATIENT)
Dept: PHARMACY | Facility: HOSPITAL | Age: 44
End: 2024-12-27
Payer: COMMERCIAL

## 2024-12-27 ENCOUNTER — APPOINTMENT (OUTPATIENT)
Dept: INTEGRATIVE MEDICINE | Facility: CLINIC | Age: 44
End: 2024-12-27
Payer: COMMERCIAL

## 2024-12-27 DIAGNOSIS — E11.9 DIABETES MELLITUS TYPE II, NON INSULIN DEPENDENT (MULTI): ICD-10-CM

## 2024-12-27 NOTE — ASSESSMENT & PLAN NOTE
A1C (11/20/2024): 6.7%. Appropriate to start Ozempic 0.25mg once weekly injection and follow up in 4 weeks to assess tolerability.

## 2024-12-27 NOTE — Clinical Note
Mari Coyne,  Today I spoke with Paul about her Ozempic and our cost assistance program. Patient has not yet started Ozempic, she was educated on administration, titration and side effects. Patient does not monitor FBG, recommend she monitor sugar a couple times per week. A prior authorization is required for ozempic, plan to start ozempic 0.25mg once weekly if approved and follow up in 1 month. Thank you!

## 2025-01-06 ENCOUNTER — TELEPHONE (OUTPATIENT)
Dept: PHARMACY | Facility: HOSPITAL | Age: 45
End: 2025-01-06
Payer: COMMERCIAL

## 2025-01-06 DIAGNOSIS — E11.9 DIABETES MELLITUS TYPE II, NON INSULIN DEPENDENT (MULTI): Primary | ICD-10-CM

## 2025-01-06 RX ORDER — METFORMIN HYDROCHLORIDE 500 MG/1
500 TABLET ORAL
Qty: 30 TABLET | Refills: 2 | Status: SHIPPED | OUTPATIENT
Start: 2025-01-06

## 2025-01-06 NOTE — TELEPHONE ENCOUNTER
Patient was informed that Ozempic prior authorization was denied. Insurance requires patient to try three alternatives including metformin, glipizide, glyburide, pioglitazone, and repaglinide. Patient reports being on metformin in the past but is agreeable to starting medication again. Plan to start Metformin 500mg tablet by mouth once daily.

## 2025-01-24 ENCOUNTER — APPOINTMENT (OUTPATIENT)
Dept: PHARMACY | Facility: HOSPITAL | Age: 45
End: 2025-01-24
Payer: COMMERCIAL

## 2025-01-29 NOTE — PROGRESS NOTES
Pharmacist Clinic: Cardiology Management    Paul Hawkins is a 44 y.o. female was referred to Clinical Pharmacy Team for diabetes management.     Referring Provider: Doretha Quiñonez, APR*    THIS IS A NEW PATIENT APPOINTMENT. PATIENT WILL BE ESTABLISHING CARE WITH CLINICAL PHARMACY.    Appointment was completed by Parkpasqualehiram who was reached at primary number.    Allergies Reviewed? Yes    Allergies   Allergen Reactions    Penicillins Hives       Past Medical History:   Diagnosis Date    Personal history of other diseases of the digestive system 07/06/2017    History of gastroesophageal reflux (GERD)    Personal history of other endocrine, nutritional and metabolic disease 04/26/2017    History of obesity       Current Outpatient Medications on File Prior to Visit   Medication Sig Dispense Refill    blood-gluc,BP meter,adult cuff device Try to check BP daily alternating times of day      blood-glucose meter misc FreeStyle Lite Device FreeStyle Lite Device USE AS DIRECTED. Quantity: 1 Refills: 0 Zurdo Parmar MD Start : 30-Oct-2018 Active 10- [id number]      cetirizine (Allergy Relief, cetirizine,) 10 mg tablet Take 1 tablet (10 mg) by mouth. As needed      cholecalciferol (Vitamin D-3) 50 MCG (2000 UT) tablet Take 2.5 tablets (5,000 Units) by mouth once daily.      FreeStyle Lite Strips strip once daily.      rosuvastatin (Crestor) 20 mg tablet Take 1 tablet (20 mg) by mouth once daily. 90 tablet 1    fluticasone (Flonase) 50 mcg/actuation nasal spray Administer into affected nostril(s). (Patient not taking: Reported on 12/27/2024)      NON FORMULARY Lcarnitina 1000mg once a day      semaglutide 0.25 mg or 0.5 mg (2 mg/3 mL) pen injector Inject 0.25 mg under the skin 1 (one) time per week. (Patient not taking: Reported on 12/27/2024) 3 mL 0     No current facility-administered medications on file prior to visit.         RELEVANT LAB RESULTS:  Lab Results  Detail Level: Detailed "  Component Value Date    BILITOT 0.2 04/29/2024    CALCIUM 9.2 11/20/2024    CO2 28 11/20/2024    CL 98 11/20/2024    CREATININE 0.58 11/20/2024    GLUCOSE 118 (H) 11/20/2024    ALKPHOS 40 04/29/2024    K 4.2 11/20/2024    PROT 7.1 04/29/2024     11/20/2024    AST 12 04/29/2024    ALT 17 04/29/2024    BUN 16 11/20/2024    ANIONGAP 14 11/20/2024    ALBUMIN 4.1 04/29/2024    GFRF >90 07/26/2023     Lab Results   Component Value Date    TRIG 91 11/20/2024    CHOL 211 (H) 11/20/2024    LDLCALC 149 (H) 11/20/2024    HDL 43.8 11/20/2024     No results found for: \"BMCBC\", \"CBCDIF\"     PHARMACEUTICAL ASSESSMENT:    MEDICATION RECONCILIATION    Home Pharmacy Reviewed? Yes, describe: [facility] [place]; [place]; [place]    Drug Interactions? No    Medication Documentation Review Audit       Reviewed by Anastacio Muniz DC (Physician) on 11/01/24 at 1510      Medication Order Taking? Sig Documenting Provider Last Dose Status   blood-gluc,BP meter,adult cuff device [id number] No Try to check BP daily alternating times of day Historical Provider, MD Taking Active   blood-glucose meter misc [id number] No FreeStyle Lite Device FreeStyle Lite Device USE AS DIRECTED. Quantity: 1 Refills: 0 Zurdo Parmar MD Start : 30-Oct-2018 Active 10- Zurdo [id number]) Historical Provider, MD Taking Active   cetirizine (Allergy Relief, cetirizine,) 10 mg tablet [id number] No Take 1 tablet (10 mg) by mouth. Historical Provider, MD Taking Active   cholecalciferol (Vitamin D-3) 50 MCG (2000 UT) tablet [id number] No Take 2.5 tablets (5,000 Units) by mouth once daily.   Patient not taking: Reported on 10/17/2024    Historical Provider, MD Taking Active   fluticasone (Flonase) 50 mcg/actuation nasal spray [id number] No Administer into affected nostril(s).   Patient not taking: Reported on 10/17/2024    Historical Provider, MD Taking Active   FreeStyle Lite Strips strip [id number] No once daily. " Add 19405 Cpt? (Important Note: In 2017 The Use Of 96096 Is Being Tracked By Cms To Determine Future Global Period Reimbursement For Global Periods): no Historical Provider, MD Taking Active   NON FORMULARY [id number] No Lcarnitina 1000mg once a day Historical Provider, MD Taking Active   rosuvastatin (Crestor) 20 mg tablet [id number] No Take 1 tablet (20 mg) by mouth once daily.   Patient not taking: Reported on 10/17/2024    Khris Mandujano MD Taking Active                    DISEASE MANAGEMENT ASSESSMENT:     DIABETES ASSESSMENT     PMH REVIEW:  - PMH of Pancreatitis: No  - PMH of Retinopathy: No  - PMH of Urinary Tract Infections: No  - PMH of MTC: No    HEALTH MAINTENANCE:   Foot Exam: does not use- recommend patient do this annually  Eye Exam: does not use-recommend patient do this annually  Lipid Panel: 11/20/2024-LDL elevated  Urine Albumin: 4.1    PRIMARY/SECONDARY PREVENTION:   - Statin? Yes  - ACE-I/ARB? No  - Aspirin? No    Last Recorded Vitals:  BP Readings from Last 6 Encounters:   10/17/24 138/70   04/29/24 128/74   12/11/23 130/80   07/26/23 132/78   06/16/22 120/83   10/07/21 122/80       Wt Readings from Last 6 Encounters:   10/17/24 135 kg (297 lb)   10/08/24 132 kg (291 lb 0.1 oz)   04/29/24 132 kg (290 lb)   02/07/24 133 kg (293 lb)   12/11/23 132 kg (290 lb)   07/26/23 132 kg (290 lb)       CURRENT PHARMACOTHERAPY:    Ozempic 0.25mg subcutaneous injection once weekly- prescribed but patient not taking     Affordability/Accessibility:  PAP screen  Adherence/Organization: N/A  Adverse Effects:   N/A    The patient does not have a known family history of diabetes, however she is adopted.    Current diet:  Vegetables, meat, trying to drink more water  Current exercise: none; patient works from home    Current monitoring regimen:   Patient is using: glucometer-patient only takes sugars when she is fatigued- she reports they are usually high, encouraged patient to start monitoring FBG when she gets up in the morning.    Any episodes of hypoglycemia? no    Lab Results   Component Value Date    HGBA1C 6.7 (H) 11/20/2024       Patients diabetes is well  Wound Evaluated By (Optional): N/A Wound Diameter In Cm(Optional): 0 controlled with most recent A1c of 6.7% (Goal < 7%).        DISCUSSION/NOTES:   Today was an initial visit to establish with clinical pharmacy. Patient medications and allergies were reviewed and updated.   Patient has not started on Ozempic 0.25mg once weekly injection. Education was provided on administration, titration, side effects and monitoring parameters of Ozempic. All patient questions/concerns were answered.   Patient reports she eats vegetables and meat and is trying to incorporate more water. Patient does not exercise, she works from home. Patient only tests her sugars when she is feeling fatigued or crashing, she reports they are usually high when she tests them. Recommend patient start taking FBG in the mornings, discussed goal 80-130mg/dL, patient verbalized understanding.   Patient was screened for  PAP, unfortunately she makes too much money to qualify for  PAP at this time. A prior authorization is needed for Ozempic, discussed with patient that until this is approved we will not know the copy of the medication.     ASSESSMENT:    Unfortunately, at this time, Paul Hawkins is ineligible to receive financial assistance through  Patient Assistance Program because she makes too much money.    Lashaun Ma, PharmD    Assessment/Plan   Problem List Items Addressed This Visit       Diabetes mellitus type II, non insulin dependent (Multi)     A1C (11/20/2024): 6.7%. Appropriate to start Ozempic 0.25mg once weekly injection and follow up in 4 weeks to assess tolerability.         Relevant Orders    Referral to Clinical Pharmacy       RECOMMENDATIONS/PLAN:    CONTINUE  Ozempic 0.25mg once weekly injection    Last Appnt with Referring Provider: 11/21/2024  Next Appnt with Referring Provider: 01/17/2025  Clinical Pharmacist follow up: 1 month  VAF/Application Expiration: No-- too much income  Type of Encounter: Virtual    Lashaun Ma PharmD    Verbal consent to manage patient's drug therapy was  Wound Crusting?: clean obtained from the patient . They were informed they may decline to participate or withdraw from participation in pharmacy services at any time.    Continue all meds under the continuation of care with the referring provider and clinical pharmacy team.      Sutures?: intact Wound Color?: pink

## 2025-01-30 ENCOUNTER — APPOINTMENT (OUTPATIENT)
Dept: OBSTETRICS AND GYNECOLOGY | Facility: CLINIC | Age: 45
End: 2025-01-30
Payer: COMMERCIAL

## 2025-02-21 ENCOUNTER — APPOINTMENT (OUTPATIENT)
Dept: PHARMACY | Facility: HOSPITAL | Age: 45
End: 2025-02-21
Payer: COMMERCIAL

## 2025-03-13 ENCOUNTER — APPOINTMENT (OUTPATIENT)
Dept: OBSTETRICS AND GYNECOLOGY | Facility: CLINIC | Age: 45
End: 2025-03-13
Payer: COMMERCIAL

## 2025-04-25 ENCOUNTER — APPOINTMENT (OUTPATIENT)
Dept: OBSTETRICS AND GYNECOLOGY | Facility: CLINIC | Age: 45
End: 2025-04-25
Payer: COMMERCIAL